# Patient Record
Sex: FEMALE | Race: WHITE | NOT HISPANIC OR LATINO | Employment: FULL TIME | URBAN - METROPOLITAN AREA
[De-identification: names, ages, dates, MRNs, and addresses within clinical notes are randomized per-mention and may not be internally consistent; named-entity substitution may affect disease eponyms.]

---

## 2017-02-16 ENCOUNTER — TELEPHONE (OUTPATIENT)
Dept: OBGYN | Facility: CLINIC | Age: 42
End: 2017-02-16

## 2017-02-16 ENCOUNTER — RADIANT APPOINTMENT (OUTPATIENT)
Dept: ULTRASOUND IMAGING | Facility: CLINIC | Age: 42
End: 2017-02-16
Payer: COMMERCIAL

## 2017-02-16 ENCOUNTER — OFFICE VISIT (OUTPATIENT)
Dept: OBGYN | Facility: CLINIC | Age: 42
End: 2017-02-16
Payer: COMMERCIAL

## 2017-02-16 VITALS — BODY MASS INDEX: 24.55 KG/M2 | WEIGHT: 143 LBS

## 2017-02-16 DIAGNOSIS — D25.1 INTRAMURAL LEIOMYOMA OF UTERUS: ICD-10-CM

## 2017-02-16 DIAGNOSIS — N84.0 ENDOMETRIAL POLYP: Primary | ICD-10-CM

## 2017-02-16 PROCEDURE — 99213 OFFICE O/P EST LOW 20 MIN: CPT | Performed by: OBSTETRICS & GYNECOLOGY

## 2017-02-16 PROCEDURE — 76830 TRANSVAGINAL US NON-OB: CPT | Performed by: OBSTETRICS & GYNECOLOGY

## 2017-02-16 NOTE — MR AVS SNAPSHOT
"              After Visit Summary   2/16/2017    Claire Khan    MRN: [id number]           Patient Information     Date Of Birth          1975        Visit Information        Provider Department      2/16/2017 3:45 PM Sj Samuel MD [facility]        Today's Diagnoses     Endometrial polyp    -  1       Follow-ups after your visit        Your next 10 appointments already scheduled     Mar 28, 2017  3:45 PM CDT   SHORT with Sj Samuel MD   [facility] ([facility])    [address] [place] [id number] [phone]              Who to contact     If you have questions or need follow up information about today's clinic visit or your schedule please contact [facility] directly at [phone].  Normal or non-critical lab and imaging results will be communicated to you by Humhart, letter or phone within 4 business days after the clinic has received the results. If you do not hear from us within 7 days, please contact the clinic through Humhart or phone. If you have a critical or abnormal lab result, we will notify you by phone as soon as possible.  Submit refill requests through DataFlyte or call your pharmacy and they will forward the refill request to us. Please allow 3 business days for your refill to be completed.          Additional Information About Your Visit        MyChart Information     DataFlyte lets you send messages to your doctor, view your test results, renew your prescriptions, schedule appointments and more. To sign up, go to [email or web address] . Click on \"Log in\" on the left side of the screen, which will take you to the Welcome page. Then click on \"Sign up Now\" on the right side of the page.     You will be asked to enter the access code listed below, as well as some personal information. Please follow the directions to create your username and password.     Your access code " is: [email or web address]  Expires: 2017  3:55 PM     Your access code will  in 90 days. If you need help or a new code, please call your [place] clinic or [phone].        Care EveryWhere ID     This is your Care EveryWhere ID. This could be used by other organizations to access your [place] medical records  [id number]        Your Vitals Were     Last Period BMI (Body Mass Index)                2017 (Exact Date) 24.55 kg/m2           Blood Pressure from Last 3 Encounters:   16 124/70   16 142/82   10/24/16 120/76    Weight from Last 3 Encounters:   17 143 lb (64.9 kg)   16 146 lb (66.2 kg)   16 148 lb (67.1 kg)              Today, you had the following     No orders found for display       Primary Care Provider Office Phone # Fax #    Rizwan Lazo [phone]       [facility] [address] [place] [address]        Thank you!     Thank you for choosing [facility]  for your care. Our goal is always to provide you with excellent care. Hearing back from our patients is one way we can continue to improve our services. Please take a few minutes to complete the written survey that you may receive in the mail after your visit with us. Thank you!             Your Updated Medication List - Protect others around you: Learn how to safely use, store and throw away your medicines at www.disposemymeds.org.          This list is accurate as of: 17  3:59 PM.  Always use your most recent med list.                   Brand Name Dispense Instructions for use    ALPRAZolam 1 MG tablet    XANAX     TK 1 T PO QD PRN       aspirin 325 MG tablet      Take 325 mg by mouth daily       baclofen 10 MG tablet    LIORESAL     TK ONE T PO BID PRF MUSCLE SPASM       garlic 150 MG Tabs tablet      Take 150 mg by mouth daily Reported on 2017       GLUCOSAMINE CHOND COMPLEX/MSM PO      Reported on 2017       oxybutynin chloride 15  MG Tb24      Take 15 mg by mouth daily       TYLENOL 500 MG tablet   Generic drug:  acetaminophen      Take 500-1,000 mg by mouth every 6 hours as needed.       ZOLOFT PO      Take 100 mg by mouth daily

## 2017-02-16 NOTE — TELEPHONE ENCOUNTER
Patient surgery scheduled on 4/6/2017 at 7:20AM Check in 5:30AM  Location for surgery to performed:   Surgery Outpatient  Scheduled by Lizet 2/16/2017     Information Packet given :Yes: HANDED 2/16/2017    CPT codes given: Yes    24517   90431     Consents signed? No  Rep Informed :N/A    PREOP DATE :  3/28/2017  In Epic :Yes    On Spreadsheet :Yes    On Calendar EB  :Yes    In [place] Calendar MIKE  :No    Assist NA   Assist in Epic NA  Assist Notified as needed :No     Jaky Allison  Surgery Scheduler      Amount of time needed for the procedure: 30 minutes   Expected time off from work: 2 days  Surgeon: Sj Samuel MD  Surgical Procedure:  Hysteroscopy, polypectomy, endometrial curettage, possible endometrial ablation  Preop Needed: No  Location for surgery to performed: Surgery Outpatient  Anesthesia: MAC and possible spinal            Allergies   Allergen Reactions     Bactrim         Blisters or cancer sores on tongue     Paxil [Paroxetine]         Panic attack     Sulfa Drugs Unknown       Mouth Sores     Vicodin [Hydrocodone-Acetaminophen]           DIAGNOSIS: Menorrhagia, endometrial polyp     Special Instructions:

## 2017-02-16 NOTE — LETTER
February 24, 2017      Claire Khan  [address] [facility] [address]      Dear Claire,    We have made effort to obtain an accurate quote from your insurance company based on the information we have on file. Your actual coverage may differ. [facility] can NOT guarantee coverage. We recommend that if you have questions regarding coverage to call your insurance company. Our billing department is happy to assist you with your insurance claim but you are responsible for all services rendered whether or not they are paid by your insurance provider. Again, this is not a guarantee of benefits just a notice of the information they have given to us.     Ins[employer] Phone # [phone]  ID [id number]   Group [id number]  Jaky torres/ [place]  On 2/24/2017     Policy Eff Date 1/1/2017 and current Yes  CoInsurance (covered at) 80% after deductible has been met.  Deductible $500.00 met to date $500.00  MOOP $3500.00 met to date $706.80  CoPay NA  Prior Auth Required:  No  Pre Existing Condition No  Surgeon JADYN Samuel In Network Yes  [facility] In Network Yes  Referral Needed:  No.  Mailed to Patient Yes                  If No Document why by date                  If Yes                                   Date 2/24/2017    Sincerely,        Jaky Allison  Surgery Scheduler

## 2017-02-16 NOTE — PROGRESS NOTES
SUBJECTIVE:                                                   Claire Khan is a 41 year old female who presents to clinic today for the following health issue(s):  Patient presents with:  Ultrasound: uterine fibroids        HPI: The patient is seen in follow-up of possible uterine fibroids and abnormal bleeding.  An ultrasound is planned for today.    Patient's last menstrual period was 01/28/2017 (exact date)..   Patient is sexually active, No obstetric history on file..  Using condoms for contraception.    reports that she has never smoked. She has never used smokeless tobacco.    STD testing offered?  Declined    Health maintenance updated:  yes    Today's PHQ-2 Score:   PHQ-2 ( 1999 [employer]) 8/18/2015   Q1: Little interest or pleasure in doing things 0   Q2: Feeling down, depressed or hopeless 0   PHQ-2 Score 0     Today's PHQ-9 Score: No flowsheet data found.  Today's PINA-7 Score: No flowsheet data found.    Problem list and histories reviewed & adjusted, as indicated.  Additional history: as documented.    Patient Active Problem List   Diagnosis   (none) - all problems resolved or deleted     Past Surgical History   Procedure Laterality Date     Partial hymenectomy/revision hymenal ring       External ear surgery       [place] teeth[       Arthroscopy knee with patellar realignment  6/29/2012     Procedure: ARTHROSCOPY KNEE WITH PATELLAR REALIGNMENT;  Right Knee Arthroscopy, Right Medial Patellar Femoral Ligament  Reconstruction with Graft, Partial Lateral Facetectomy, Lateral Retinacular Lengthening  ;  Surgeon: Rain Noe MD;  [facility]     Arthroscopy knee with retinacular release medial or lateral  5/30/2014     Procedure: ARTHROSCOPY KNEE WITH RETINACULAR RELEASE MEDIAL OR LATERAL;  Surgeon: Rain Noe MD;  [facility]      Social History   Substance Use Topics     Smoking status: Never Smoker     Smokeless tobacco: Never Used     Alcohol use Yes      Comment: 1 DRINK  PER MONTH      Problem (# of Occurrences) Relation (Name,Age of Onset)    C.A.D. (1) Mother    DIABETES (1) Father            Current Outpatient Prescriptions   Medication Sig     ALPRAZolam (XANAX) 1 MG tablet TK 1 T PO QD PRN     baclofen (LIORESAL) 10 MG tablet TK ONE T PO BID PRF MUSCLE SPASM     oxybutynin chloride 15 MG TB24 Take 15 mg by mouth daily     aspirin 325 MG tablet Take 325 mg by mouth daily     acetaminophen (TYLENOL) 500 MG tablet Take 500-1,000 mg by mouth every 6 hours as needed.     Sertraline HCl (ZOLOFT PO) Take 100 mg by mouth daily      garlic 150 MG TABS Take 150 mg by mouth daily Reported on 2/16/2017     Misc Natural Products (GLUCOSAMINE CHOND COMPLEX/MSM PO) Reported on 2/16/2017     No current facility-administered medications for this visit.      Allergies   Allergen Reactions     Bactrim      Blisters or cancer sores on tongue     Paxil [Paroxetine]      Panic attack     Sulfa Drugs Unknown     Mouth Sores     Vicodin [Hydrocodone-Acetaminophen]        ROS:  12 point review of systems negative other than symptoms noted below.  Genitourinary: Pelvic Pain  Psychiatric: Anxiety    OBJECTIVE:     Wt 143 lb (64.9 kg)  LMP 01/28/2017 (Exact Date)  BMI 24.55 kg/m2  Body mass index is 24.55 kg/(m^2).    Exam:  Constitutional:  Appearance: Well nourished, well developed alert, in no acute distress  Chest:  Respiratory Effort:  Breathing unlabored  Cardiovascular: Heart: Auscultation:  Regular rate, normal rhythm, no murmurs present  Gastrointestinal:  Abdominal Examination:  Abdomen nontender to palpation, tone normal without rigidity or guarding, no masses present, umbilicus without lesions; Liver/Spleen:  No hepatomegaly present, liver nontender to palpation; Hernias:  No hernias present  No Pelvic Exam performed     In-Clinic Test Results:  Results for orders placed or performed in visit on 02/16/17   US Transvaginal Non OB    Narrative    US Transvaginal Non OB    Order #: [id number]  Accession #: [id number]         Study Notes        Jennifer Louise on 2/16/2017  3:25 PM     Gynecological Ultrasound Report  Pelvic U/S - Transvaginal  [facility]  Referring Provider: DR CRUZ  Sonographer: JENNIFER LOUISE  Indication: recheck fibroid  LMP: No LMP recorded.  History: fibroid(s)  Gynecological Ultrasonography:   Uterus: anteverted  Size: 7.3 x 6.7 x 4.4cm  Findings: single left subserosal fibroid measures 3.5x3.1cm versus   3.0x2.8cm on ultrasound 10/24/2016.   Endometrium: Thickness total 8.6mm  Findings: 2D and 3D views show possible 6mm intra-cavity polyp versus clot  Right Ovary: 3.2 x 2.0cm   Left Ovary: 2.7 x 1.7cm  Cul de Sac/Pouch of Mahesh: clear      Impression: myoma     SONOGRAPHER NOTES TO READING PROVIDER: No pelvic cyst, mass or free fluid.                  Discussed here         ASSESSMENT/PLAN:                                                      The patient is seen at this time and evaluation of abnormal bleeding. Her ultrasound shows a subserosal 3 x 3.5 cm fundal fibroid. She also has an endometrial polyp. We recommend a hysteroscopy with polypectomy and possible endometrial ablation if she has decided that she is completely done with childbearing.    Amount of time needed for the procedure:  30 minutes   Expected time off from work:  2 days  Surgeon:  Sj Cruz MD  Surgical Procedure:  Hysteroscopy, polypectomy, endometrial curettage, possible endometrial ablation  Preop Needed:  No  Location for surgery to performed:   Surgery Outpatient  Anesthesia:  MAC and possible spinal     Allergies   Allergen Reactions     Bactrim      Blisters or cancer sores on tongue     Paxil [Paroxetine]      Panic attack     Sulfa Drugs Unknown     Mouth Sores     Vicodin [Hydrocodone-Acetaminophen]        DIAGNOSIS:  Menorrhagia, endometrial polyp    Special Instructions:              Sj Cruz MD  [facility]

## 2017-02-24 NOTE — TELEPHONE ENCOUNTER
We have made effort to obtain an accurate quote from your insurance company based on the information we have on file. Your actual coverage may differ. [facility] can NOT guarantee coverage. We recommend that if you have questions regarding coverage to call your insurance company. Our billing department is happy to assist you with your insurance claim but you are responsible for all services rendered whether or not they are paid by your insurance provider. Again, this is not a guarantee of benefits just a notice of the information they have given to us.     [employer] Phone # [phone]  ID [id number]   Group [id number]  Jaky georges w/ [place]  On 2/24/2017     Policy Eff Date 1/1/2017 and current Yes  CoInsurance (covered at) 80% after deductible has been met.  Deductible $500.00 met to date $500.00  MOOP $3500.00 met to date $706.80  CoPay NA  Prior Auth Required:  No  Pre Existing Condition No  Surgeon JADYN Samuel In Network Yes  [facility]ospital [facility] In Network Yes  Referral Needed:  No.  Mailed to Patient Yes                  If No Document why by date                  If Yes                                   Date 2/24/2017    Jaky Allison  Surgery Scheduler

## 2017-03-07 ENCOUNTER — TELEPHONE (OUTPATIENT)
Dept: OBGYN | Facility: CLINIC | Age: 42
End: 2017-03-07

## 2017-03-07 NOTE — TELEPHONE ENCOUNTER
Patient has her Pre-op scheduled the end of March and she's wondering why she isn't getting another ultra sound prior to her surgery. She would like someone to call her back.

## 2017-03-07 NOTE — TELEPHONE ENCOUNTER
Reason for Call:  Other call back    Detailed comments: pt called back and wants triage to call her. She is wondering if she needs an ultrasound at her pre-op appt?    Phone Number Patient can be reached at: Cell number on file:    Telephone Information:   Mobile [phone]       Best Time: today    Can we leave a detailed message on this number? YES    Call taken on 3/7/2017 at 4:11 PM by Darcie Chowdhury

## 2017-03-07 NOTE — TELEPHONE ENCOUNTER
Pt has 4/6/17 HYSTEROSCOPY, POLYPECTOMY WITH VERSAPOINT, POSSIBLE NOVASURE ABLATION scheduled. Pt would like to know why she is not having an US prior to surgery. Reviewed with Sabrina Castillo and there is not a reason to repeat the US prior to surgery. LMTCB

## 2017-03-08 NOTE — TELEPHONE ENCOUNTER
LM informing pt that according to Dr. Samuel and Sabrina Castillo no need for u/s at pt's pre-op appt. Left call back number for any questions.

## 2017-03-08 NOTE — TELEPHONE ENCOUNTER
Pt has pre-op scheduled 3/28/17 and wants to know if she should be getting and ultrasound at her pre-op appt and if not the reasoning why. Note routed to Sabrina Castillo to review and advise.

## 2017-03-28 ENCOUNTER — OFFICE VISIT (OUTPATIENT)
Dept: OBGYN | Facility: CLINIC | Age: 42
End: 2017-03-28
Payer: COMMERCIAL

## 2017-03-28 VITALS
WEIGHT: 146 LBS | BODY MASS INDEX: 24.92 KG/M2 | DIASTOLIC BLOOD PRESSURE: 82 MMHG | HEIGHT: 64 IN | SYSTOLIC BLOOD PRESSURE: 136 MMHG

## 2017-03-28 DIAGNOSIS — N84.0 ENDOMETRIAL POLYP: Primary | ICD-10-CM

## 2017-03-28 DIAGNOSIS — Z01.818 PRE-OP EXAM: Primary | ICD-10-CM

## 2017-03-28 LAB — HGB BLD-MCNC: 14.6 G/DL (ref 11.7–15.7)

## 2017-03-28 PROCEDURE — 99214 OFFICE O/P EST MOD 30 MIN: CPT | Performed by: OBSTETRICS & GYNECOLOGY

## 2017-03-28 PROCEDURE — 36415 COLL VENOUS BLD VENIPUNCTURE: CPT | Performed by: OBSTETRICS & GYNECOLOGY

## 2017-03-28 PROCEDURE — 85018 HEMOGLOBIN: CPT | Performed by: OBSTETRICS & GYNECOLOGY

## 2017-03-28 NOTE — PROGRESS NOTES
SUBJECTIVE:                                                   Claire Khan is a 42 year old female who presents to clinic today for the following health issue(s):  Patient presents with:  Pre-Op Exam: scheduled for hysteroscopy, polypectomy and possible ablation on 4/6/17      HPI: The patient is seen for clearance for an outpatient hysteroscopic procedure. She has an intrauterine filling defect that is either a polyp or a fibroid. She also has a subserosal 3.5 cm fibroid that is not symptomatic. We have discussed hysteroscopy with removal of the mass for pathology. We have discussed doing an endometrial ablation so that she cannot reform any polyps. The patient seems to be somewhat ambivalent today about childbearing and I told her that she clearly had to make that decision and conveyed that information be at her preop consultation just before surgery. We will review her consent form at that time.      Patient's last menstrual period was 03/19/2017..   Patient is sexually active  Using codoms for contraception.   reports that she has never smoked. She has never used smokeless tobacco.      Problem list and histories reviewed & adjusted, as indicated.  Additional history: as documented.    Patient Active Problem List   Diagnosis   (none) - all problems resolved or deleted     Past Surgical History:   Procedure Laterality Date     ARTHROSCOPY KNEE WITH PATELLAR REALIGNMENT  6/29/2012    Procedure: ARTHROSCOPY KNEE WITH PATELLAR REALIGNMENT;  Right Knee Arthroscopy, Right Medial Patellar Femoral Ligament  Reconstruction with Graft, Partial Lateral Facetectomy, Lateral Retinacular Lengthening  ;  Surgeon: Rain Noe MD;  [facility]     ARTHROSCOPY KNEE WITH RETINACULAR RELEASE  5/30/2014    Procedure: ARTHROSCOPY KNEE WITH RETINACULAR RELEASE MEDIAL OR LATERAL;  Surgeon: Rain Noe MD;  [facility]     EXTERNAL EAR SURGERY       PARTIAL HYMENECTOMY/REVISION HYMENAL RING        "WISDOM TEETH[        Social History   Substance Use Topics     Smoking status: Never Smoker     Smokeless tobacco: Never Used     Alcohol use Yes      Comment: 1 DRINK PER MONTH      Problem (# of Occurrences) Relation (Name,Age of Onset)    C.A.D. (1) Mother    DIABETES (1) Father            Current Outpatient Prescriptions   Medication Sig     Multiple Vitamins-Minerals (MULTIVITAMIN ADULT PO)      Cyanocobalamin (VITAMIN B12 PO)      Cholecalciferol (VITAMIN D-3 PO)      Omega-3 Fatty Acids (FISH OIL PO)      TRAMADOL HCL PO Take 50 mg by mouth     ALPRAZolam (XANAX) 1 MG tablet TK 1 T PO QD PRN     baclofen (LIORESAL) 10 MG tablet TK ONE T PO BID PRF MUSCLE SPASM     aspirin 325 MG tablet Take 325 mg by mouth daily     acetaminophen (TYLENOL) 500 MG tablet Take 500-1,000 mg by mouth every 6 hours as needed.     Sertraline HCl (ZOLOFT PO) Take 100 mg by mouth daily      oxybutynin chloride 15 MG TB24 Take 15 mg by mouth as needed Reported on 3/28/2017     No current facility-administered medications for this visit.      Allergies   Allergen Reactions     Bactrim      Blisters or cancer sores on tongue     Paxil [Paroxetine]      Panic attack     Sulfa Drugs Unknown     Mouth Sores     Vicodin [Hydrocodone-Acetaminophen]      anxiety       ROS:  12 point review of systems negative other than symptoms noted below.  Constitutional: Fatigue  Genitourinary: Pelvic Pain  Psychiatric: Anxiety and Depression    OBJECTIVE:     /82  Ht 5' 4\" (1.626 m)  Wt 146 lb (66.2 kg)  LMP 03/19/2017  BMI 25.06 kg/m2  Body mass index is 25.06 kg/(m^2).    Exam:  Constitutional:  Appearance: Well nourished, well developed alert, in no acute distress  Neck:  Lymph Nodes:  No lymphadenopathy present; Thyroid:  Gland size normal, nontender, no nodules or masses present on palpation  Chest:  Respiratory Effort:  Breathing unlabored  Cardiovascular: Heart: Auscultation:  Regular rate, normal rhythm, no murmurs " present  Gastrointestinal:  Abdominal Examination:  Abdomen nontender to palpation, tone normal without rigidity or guarding, no masses present, umbilicus without lesions; Liver/Spleen:  No hepatomegaly present, liver nontender to palpation; Hernias:  No hernias present  Lymphatic: Lymph Nodes:  No other lymphadenopathy present  Skin:General Inspection:  No rashes present, no lesions present, no areas of discoloration; Genitalia and Groin:  No rashes present, no lesions present, no areas of discoloration, no masses present.  No Pelvic Exam performed today    In-Clinic Test Results:  Results for orders placed or performed in visit on 03/28/17 (from the past 24 hour(s))   Hemoglobin   Result Value Ref Range    Hemoglobin 14.6 11.7 - 15.7 g/dL       ASSESSMENT/PLAN:                                                      42-year-old patient who is scheduled for a hysteroscopic polypectomy and possible ablation. The patient wants this done under spinal anesthetic. She also has factor V. She will not take any aspirin for a week prior to surgery. The risks and Complications of the procedure have been reviewed and accepted. The patient will decide on the ablation portion depending on her desire for any fertility.            Sj Samuel MD  [facility] [place]

## 2017-03-28 NOTE — MR AVS SNAPSHOT
"              After Visit Summary   3/28/2017    Claire Khan    MRN: [id number]           Patient Information     Date Of Birth          1975        Visit Information        Provider Department      3/28/2017 3:45 PM Sj Samuel MD [facility]         Follow-ups after your visit        Your next 10 appointments already scheduled     2017   Procedure with Sj Samuel MD   [facility] (--)    [address], [address]  [facility] [id number] [phone]              Who to contact     If you have questions or need follow up information about today's clinic visit or your schedule please contact [facility] directly at [phone].  Normal or non-critical lab and imaging results will be communicated to you by MyChart, letter or phone within 4 business days after the clinic has received the results. If you do not hear from us within 7 days, please contact the clinic through MyChart or phone. If you have a critical or abnormal lab result, we will notify you by phone as soon as possible.  Submit refill requests through [employer] or call your pharmacy and they will forward the refill request to us. Please allow 3 business days for your refill to be completed.          Additional Information About Your Visit        MyChart Information     [employer] lets you send messages to your doctor, view your test results, renew your prescriptions, schedule appointments and more. To sign up, go to [email or web address] . Click on \"Log in\" on the left side of the screen, which will take you to the Welcome page. Then click on \"Sign up Now\" on the right side of the page.     You will be asked to enter the access code listed below, as well as some personal information. Please follow the directions to create your username and password.     Your access code is: [email or web address]  Expires: 2017  4:55 PM     Your access code will  in 90 days. If " "you need help or a new code, please call your [facility] clinic or [phone].        Care EveryWhere ID     This is your Care EveryWhere ID. This could be used by other organizations to access your [facility] medical records  [id number]        Your Vitals Were     Height Last Period BMI (Body Mass Index)             5' 4\" (1.626 m) 03/19/2017 25.06 kg/m2          Blood Pressure from Last 3 Encounters:   03/28/17 136/82   12/07/16 124/70   11/02/16 142/82    Weight from Last 3 Encounters:   03/28/17 146 lb (66.2 kg)   02/16/17 143 lb (64.9 kg)   12/07/16 146 lb (66.2 kg)              Today, you had the following     No orders found for display       Primary Care Provider Office Phone # Fax #    Rizwan Lazo [phone]       [facility] [address] [place] [address]        Thank you!     Thank you for choosing [facility]  for your care. Our goal is always to provide you with excellent care. Hearing back from our patients is one way we can continue to improve our services. Please take a few minutes to complete the written survey that you may receive in the mail after your visit with us. Thank you!             Your Updated Medication List - Protect others around you: Learn how to safely use, store and throw away your medicines at www.disposemymeds.org.          This list is accurate as of: 3/28/17  4:24 PM.  Always use your most recent med list.                   Brand Name Dispense Instructions for use    ALPRAZolam 1 MG tablet    XANAX     TK 1 T PO QD PRN       aspirin 325 MG tablet      Take 325 mg by mouth daily       baclofen 10 MG tablet    LIORESAL     TK ONE T PO BID PRF MUSCLE SPASM       FISH OIL PO          MULTIVITAMIN ADULT PO          oxybutynin chloride 15 MG Tb24      Take 15 mg by mouth as needed Reported on 3/28/2017       TRAMADOL HCL PO      Take 50 mg by mouth       TYLENOL 500 MG tablet   Generic drug:  acetaminophen      Take " 500-1,000 mg by mouth every 6 hours as needed.       VITAMIN B12 PO          VITAMIN D-3 PO          ZOLOFT PO      Take 100 mg by mouth daily

## 2017-04-04 NOTE — H&P (VIEW-ONLY)
SUBJECTIVE:                                                   Claire Khan is a 42 year old female who presents to clinic today for the following health issue(s):  Patient presents with:  Pre-Op Exam: scheduled for hysteroscopy, polypectomy and possible ablation on 4/6/17      HPI: The patient is seen for clearance for an outpatient hysteroscopic procedure. She has an intrauterine filling defect that is either a polyp or a fibroid. She also has a subserosal 3.5 cm fibroid that is not symptomatic. We have discussed hysteroscopy with removal of the mass for pathology. We have discussed doing an endometrial ablation so that she cannot reform any polyps. The patient seems to be somewhat ambivalent today about childbearing and I told her that she clearly had to make that decision and conveyed that information be at her preop consultation just before surgery. We will review her consent form at that time.      Patient's last menstrual period was 03/19/2017..   Patient is sexually active  Using codoms for contraception.   reports that she has never smoked. She has never used smokeless tobacco.      Problem list and histories reviewed & adjusted, as indicated.  Additional history: as documented.    Patient Active Problem List   Diagnosis   (none) - all problems resolved or deleted     Past Surgical History:   Procedure Laterality Date     ARTHROSCOPY KNEE WITH PATELLAR REALIGNMENT  6/29/2012    Procedure: ARTHROSCOPY KNEE WITH PATELLAR REALIGNMENT;  Right Knee Arthroscopy, Right Medial Patellar Femoral Ligament  Reconstruction with Graft, Partial Lateral Facetectomy, Lateral Retinacular Lengthening  ;  Surgeon: Rain Noe MD;  [facility]     ARTHROSCOPY KNEE WITH RETINACULAR RELEASE  5/30/2014    Procedure: ARTHROSCOPY KNEE WITH RETINACULAR RELEASE MEDIAL OR LATERAL;  Surgeon: Rain Noe MD;  Location: [facility]     EXTERNAL EAR SURGERY       PARTIAL HYMENECTOMY/REVISION HYMENAL RING        "WISDOM TEETH[        Social History   Substance Use Topics     Smoking status: Never Smoker     Smokeless tobacco: Never Used     Alcohol use Yes      Comment: 1 DRINK PER MONTH      Problem (# of Occurrences) Relation (Name,Age of Onset)    C.A.D. (1) Mother    DIABETES (1) Father            Current Outpatient Prescriptions   Medication Sig     Multiple Vitamins-Minerals (MULTIVITAMIN ADULT PO)      Cyanocobalamin (VITAMIN B12 PO)      Cholecalciferol (VITAMIN D-3 PO)      Omega-3 Fatty Acids (FISH OIL PO)      TRAMADOL HCL PO Take 50 mg by mouth     ALPRAZolam (XANAX) 1 MG tablet TK 1 T PO QD PRN     baclofen (LIORESAL) 10 MG tablet TK ONE T PO BID PRF MUSCLE SPASM     aspirin 325 MG tablet Take 325 mg by mouth daily     acetaminophen (TYLENOL) 500 MG tablet Take 500-1,000 mg by mouth every 6 hours as needed.     Sertraline HCl (ZOLOFT PO) Take 100 mg by mouth daily      oxybutynin chloride 15 MG TB24 Take 15 mg by mouth as needed Reported on 3/28/2017     No current facility-administered medications for this visit.      Allergies   Allergen Reactions     Bactrim      Blisters or cancer sores on tongue     Paxil [Paroxetine]      Panic attack     Sulfa Drugs Unknown     Mouth Sores     Vicodin [Hydrocodone-Acetaminophen]      anxiety       ROS:  12 point review of systems negative other than symptoms noted below.  Constitutional: Fatigue  Genitourinary: Pelvic Pain  Psychiatric: Anxiety and Depression    OBJECTIVE:     /82  Ht 5' 4\" (1.626 m)  Wt 146 lb (66.2 kg)  LMP 03/19/2017  BMI 25.06 kg/m2  Body mass index is 25.06 kg/(m^2).    Exam:  Constitutional:  Appearance: Well nourished, well developed alert, in no acute distress  Neck:  Lymph Nodes:  No lymphadenopathy present; Thyroid:  Gland size normal, nontender, no nodules or masses present on palpation  Chest:  Respiratory Effort:  Breathing unlabored  Cardiovascular: Heart: Auscultation:  Regular rate, normal rhythm, no murmurs " present  Gastrointestinal:  Abdominal Examination:  Abdomen nontender to palpation, tone normal without rigidity or guarding, no masses present, umbilicus without lesions; Liver/Spleen:  No hepatomegaly present, liver nontender to palpation; Hernias:  No hernias present  Lymphatic: Lymph Nodes:  No other lymphadenopathy present  Skin:General Inspection:  No rashes present, no lesions present, no areas of discoloration; Genitalia and Groin:  No rashes present, no lesions present, no areas of discoloration, no masses present.  No Pelvic Exam performed today    In-Clinic Test Results:  Results for orders placed or performed in visit on 03/28/17 (from the past 24 hour(s))   Hemoglobin   Result Value Ref Range    Hemoglobin 14.6 11.7 - 15.7 g/dL       ASSESSMENT/PLAN:                                                      42-year-old patient who is scheduled for a hysteroscopic polypectomy and possible ablation. The patient wants this done under spinal anesthetic. She also has factor V. She will not take any aspirin for a week prior to surgery. The risks and Complications of the procedure have been reviewed and accepted. The patient will decide on the ablation portion depending on her desire for any fertility.            Sj Samuel MD  [facility]

## 2017-04-05 ENCOUNTER — TELEPHONE (OUTPATIENT)
Dept: NURSING | Facility: CLINIC | Age: 42
End: 2017-04-05

## 2017-04-05 NOTE — TELEPHONE ENCOUNTER
Pt has surgery scheduled tomorrow HYSTEROSCOPY, POLYPECTOMY WITH VERSAPOINT, POSSIBLE NOVASURE ABLATION. Pt wanted to know if she could take a Xanax tonight. Reviewed with Dr. Samuel. OK to take Xanax tonight. Left detailed message on pt's voicemail. Included call back number.

## 2017-04-06 ENCOUNTER — HOSPITAL ENCOUNTER (OUTPATIENT)
Facility: CLINIC | Age: 42
Discharge: HOME | End: 2017-04-06
Attending: OBSTETRICS & GYNECOLOGY | Admitting: OBSTETRICS & GYNECOLOGY
Payer: COMMERCIAL

## 2017-04-06 ENCOUNTER — ANESTHESIA (OUTPATIENT)
Dept: SURGERY | Facility: CLINIC | Age: 42
End: 2017-04-06
Payer: COMMERCIAL

## 2017-04-06 ENCOUNTER — ANESTHESIA EVENT (OUTPATIENT)
Dept: SURGERY | Facility: CLINIC | Age: 42
End: 2017-04-06
Payer: COMMERCIAL

## 2017-04-06 VITALS
OXYGEN SATURATION: 99 % | TEMPERATURE: 98 F | WEIGHT: 143.9 LBS | RESPIRATION RATE: 23 BRPM | BODY MASS INDEX: 24.57 KG/M2 | HEIGHT: 64 IN | SYSTOLIC BLOOD PRESSURE: 126 MMHG | DIASTOLIC BLOOD PRESSURE: 87 MMHG

## 2017-04-06 DIAGNOSIS — N84.0 ENDOMETRIUM, POLYP: Primary | ICD-10-CM

## 2017-04-06 LAB — HCG SERPL QL: NEGATIVE

## 2017-04-06 PROCEDURE — 25800025 ZZH RX 258: Performed by: NURSE ANESTHETIST, CERTIFIED REGISTERED

## 2017-04-06 PROCEDURE — 27210794 ZZH OR GENERAL SUPPLY STERILE: Performed by: OBSTETRICS & GYNECOLOGY

## 2017-04-06 PROCEDURE — 71000027 ZZH RECOVERY PHASE 2 EACH 15 MINS: Performed by: OBSTETRICS & GYNECOLOGY

## 2017-04-06 PROCEDURE — 36415 COLL VENOUS BLD VENIPUNCTURE: CPT | Performed by: ANESTHESIOLOGY

## 2017-04-06 PROCEDURE — 25000132 ZZH RX MED GY IP 250 OP 250 PS 637: Performed by: OBSTETRICS & GYNECOLOGY

## 2017-04-06 PROCEDURE — 37000009 ZZH ANESTHESIA TECHNICAL FEE, EACH ADDTL 15 MIN: Performed by: OBSTETRICS & GYNECOLOGY

## 2017-04-06 PROCEDURE — 25000128 H RX IP 250 OP 636: Performed by: NURSE ANESTHETIST, CERTIFIED REGISTERED

## 2017-04-06 PROCEDURE — 25000125 ZZHC RX 250: Performed by: NURSE ANESTHETIST, CERTIFIED REGISTERED

## 2017-04-06 PROCEDURE — 84703 CHORIONIC GONADOTROPIN ASSAY: CPT | Performed by: ANESTHESIOLOGY

## 2017-04-06 PROCEDURE — 88305 TISSUE EXAM BY PATHOLOGIST: CPT | Mod: 26 | Performed by: OBSTETRICS & GYNECOLOGY

## 2017-04-06 PROCEDURE — 71000012 ZZH RECOVERY PHASE 1 LEVEL 1 FIRST HR: Performed by: OBSTETRICS & GYNECOLOGY

## 2017-04-06 PROCEDURE — 27210995 ZZH RX 272: Performed by: OBSTETRICS & GYNECOLOGY

## 2017-04-06 PROCEDURE — 88305 TISSUE EXAM BY PATHOLOGIST: CPT | Performed by: OBSTETRICS & GYNECOLOGY

## 2017-04-06 PROCEDURE — 25800025 ZZH RX 258: Performed by: OBSTETRICS & GYNECOLOGY

## 2017-04-06 PROCEDURE — 58558 HYSTEROSCOPY BIOPSY: CPT | Performed by: OBSTETRICS & GYNECOLOGY

## 2017-04-06 PROCEDURE — 37000008 ZZH ANESTHESIA TECHNICAL FEE, 1ST 30 MIN: Performed by: OBSTETRICS & GYNECOLOGY

## 2017-04-06 PROCEDURE — 40000170 ZZH STATISTIC PRE-PROCEDURE ASSESSMENT II: Performed by: OBSTETRICS & GYNECOLOGY

## 2017-04-06 PROCEDURE — 36000056 ZZH SURGERY LEVEL 3 1ST 30 MIN: Performed by: OBSTETRICS & GYNECOLOGY

## 2017-04-06 RX ORDER — KETOROLAC TROMETHAMINE 30 MG/ML
30 INJECTION, SOLUTION INTRAMUSCULAR; INTRAVENOUS EVERY 6 HOURS PRN
Status: DISCONTINUED | OUTPATIENT
Start: 2017-04-06 | End: 2017-04-06 | Stop reason: HOSPADM

## 2017-04-06 RX ORDER — KETOROLAC TROMETHAMINE 30 MG/ML
INJECTION, SOLUTION INTRAMUSCULAR; INTRAVENOUS PRN
Status: DISCONTINUED | OUTPATIENT
Start: 2017-04-06 | End: 2017-04-06

## 2017-04-06 RX ORDER — OXYCODONE AND ACETAMINOPHEN 5; 325 MG/1; MG/1
1-2 TABLET ORAL
Status: COMPLETED | OUTPATIENT
Start: 2017-04-06 | End: 2017-04-06

## 2017-04-06 RX ORDER — LIDOCAINE HYDROCHLORIDE 20 MG/ML
INJECTION, SOLUTION INFILTRATION; PERINEURAL PRN
Status: DISCONTINUED | OUTPATIENT
Start: 2017-04-06 | End: 2017-04-06

## 2017-04-06 RX ORDER — PROPOFOL 10 MG/ML
INJECTION, EMULSION INTRAVENOUS PRN
Status: DISCONTINUED | OUTPATIENT
Start: 2017-04-06 | End: 2017-04-06

## 2017-04-06 RX ORDER — ONDANSETRON 4 MG/1
4 TABLET, ORALLY DISINTEGRATING ORAL EVERY 30 MIN PRN
Status: DISCONTINUED | OUTPATIENT
Start: 2017-04-06 | End: 2017-04-06 | Stop reason: HOSPADM

## 2017-04-06 RX ORDER — FENTANYL CITRATE 50 UG/ML
INJECTION, SOLUTION INTRAMUSCULAR; INTRAVENOUS PRN
Status: DISCONTINUED | OUTPATIENT
Start: 2017-04-06 | End: 2017-04-06

## 2017-04-06 RX ORDER — SODIUM CHLORIDE, SODIUM LACTATE, POTASSIUM CHLORIDE, CALCIUM CHLORIDE 600; 310; 30; 20 MG/100ML; MG/100ML; MG/100ML; MG/100ML
INJECTION, SOLUTION INTRAVENOUS CONTINUOUS PRN
Status: DISCONTINUED | OUTPATIENT
Start: 2017-04-06 | End: 2017-04-06

## 2017-04-06 RX ORDER — PROPOFOL 10 MG/ML
INJECTION, EMULSION INTRAVENOUS CONTINUOUS PRN
Status: DISCONTINUED | OUTPATIENT
Start: 2017-04-06 | End: 2017-04-06

## 2017-04-06 RX ORDER — MAGNESIUM HYDROXIDE 1200 MG/15ML
LIQUID ORAL PRN
Status: DISCONTINUED | OUTPATIENT
Start: 2017-04-06 | End: 2017-04-06 | Stop reason: HOSPADM

## 2017-04-06 RX ORDER — NALOXONE HYDROCHLORIDE 0.4 MG/ML
.1-.4 INJECTION, SOLUTION INTRAMUSCULAR; INTRAVENOUS; SUBCUTANEOUS
Status: DISCONTINUED | OUTPATIENT
Start: 2017-04-06 | End: 2017-04-06 | Stop reason: HOSPADM

## 2017-04-06 RX ORDER — MEPERIDINE HYDROCHLORIDE 25 MG/ML
12.5 INJECTION INTRAMUSCULAR; INTRAVENOUS; SUBCUTANEOUS
Status: DISCONTINUED | OUTPATIENT
Start: 2017-04-06 | End: 2017-04-06 | Stop reason: HOSPADM

## 2017-04-06 RX ORDER — OXYCODONE AND ACETAMINOPHEN 5; 325 MG/1; MG/1
1-2 TABLET ORAL EVERY 4 HOURS PRN
Qty: 10 TABLET | Refills: 0 | Status: SHIPPED | OUTPATIENT
Start: 2017-04-06 | End: 2017-11-13

## 2017-04-06 RX ORDER — SODIUM CHLORIDE, SODIUM LACTATE, POTASSIUM CHLORIDE, CALCIUM CHLORIDE 600; 310; 30; 20 MG/100ML; MG/100ML; MG/100ML; MG/100ML
INJECTION, SOLUTION INTRAVENOUS CONTINUOUS
Status: DISCONTINUED | OUTPATIENT
Start: 2017-04-06 | End: 2017-04-06 | Stop reason: HOSPADM

## 2017-04-06 RX ORDER — DEXAMETHASONE SODIUM PHOSPHATE 4 MG/ML
INJECTION, SOLUTION INTRA-ARTICULAR; INTRALESIONAL; INTRAMUSCULAR; INTRAVENOUS; SOFT TISSUE PRN
Status: DISCONTINUED | OUTPATIENT
Start: 2017-04-06 | End: 2017-04-06

## 2017-04-06 RX ORDER — ONDANSETRON 2 MG/ML
INJECTION INTRAMUSCULAR; INTRAVENOUS PRN
Status: DISCONTINUED | OUTPATIENT
Start: 2017-04-06 | End: 2017-04-06

## 2017-04-06 RX ORDER — PHENAZOPYRIDINE HYDROCHLORIDE 200 MG/1
200 TABLET, FILM COATED ORAL ONCE
Status: DISCONTINUED | OUTPATIENT
Start: 2017-04-06 | End: 2017-04-06 | Stop reason: HOSPADM

## 2017-04-06 RX ORDER — ONDANSETRON 2 MG/ML
4 INJECTION INTRAMUSCULAR; INTRAVENOUS EVERY 30 MIN PRN
Status: DISCONTINUED | OUTPATIENT
Start: 2017-04-06 | End: 2017-04-06 | Stop reason: HOSPADM

## 2017-04-06 RX ADMIN — FENTANYL CITRATE 50 MCG: 50 INJECTION, SOLUTION INTRAMUSCULAR; INTRAVENOUS at 07:14

## 2017-04-06 RX ADMIN — DEXAMETHASONE SODIUM PHOSPHATE 4 MG: 4 INJECTION, SOLUTION INTRAMUSCULAR; INTRAVENOUS at 07:26

## 2017-04-06 RX ADMIN — PROPOFOL 200 MG: 10 INJECTION, EMULSION INTRAVENOUS at 07:15

## 2017-04-06 RX ADMIN — SODIUM CHLORIDE, POTASSIUM CHLORIDE, SODIUM LACTATE AND CALCIUM CHLORIDE: 600; 310; 30; 20 INJECTION, SOLUTION INTRAVENOUS at 07:13

## 2017-04-06 RX ADMIN — PROPOFOL 100 MCG/KG/MIN: 10 INJECTION, EMULSION INTRAVENOUS at 07:15

## 2017-04-06 RX ADMIN — KETOROLAC TROMETHAMINE 30 MG: 30 INJECTION, SOLUTION INTRAMUSCULAR at 07:33

## 2017-04-06 RX ADMIN — ONDANSETRON 4 MG: 2 INJECTION INTRAMUSCULAR; INTRAVENOUS at 07:29

## 2017-04-06 RX ADMIN — MIDAZOLAM HYDROCHLORIDE 2 MG: 1 INJECTION, SOLUTION INTRAMUSCULAR; INTRAVENOUS at 07:14

## 2017-04-06 RX ADMIN — LIDOCAINE HYDROCHLORIDE 60 MG: 20 INJECTION, SOLUTION INFILTRATION; PERINEURAL at 07:15

## 2017-04-06 RX ADMIN — PROPOFOL 40 MG: 10 INJECTION, EMULSION INTRAVENOUS at 07:27

## 2017-04-06 RX ADMIN — OXYCODONE HYDROCHLORIDE AND ACETAMINOPHEN 1 TABLET: 5; 325 TABLET ORAL at 08:21

## 2017-04-06 NOTE — DISCHARGE INSTRUCTIONS
While you were at the hospital today you received Toradol, an antiinflammatory medication similar to Ibuprofen.  You should not take other antiinflammatory medication, such as Ibuprofen, Motrin, Advil, Aleve, Naprosyn, etc, until 1:30 pm.         Same Day Surgery Discharge Instructions for  Sedation and General Anesthesia       It's not unusual to feel dizzy, light-headed or faint for up to 24 hours after surgery or while taking pain medication.  If you have these symptoms: sit for a few minutes before standing and have someone assist you when you get up to walk or use the bathroom.      You should rest and relax for the next 24 hours. We recommend you make arrangements to have an adult stay with you for at least 24 hours after your discharge.  Avoid hazardous and strenuous activity.      DO NOT DRIVE any vehicle or operate mechanical equipment for 24 hours following the end of your surgery.  Even though you may feel normal, your reactions may be affected by the medication you have received.      Do not drink alcoholic beverages for 24 hours following surgery.       Slowly progress to your regular diet as you feel able. It's not unusual to feel nauseated and/or vomit after receiving anesthesia.  If you develop these symptoms, drink clear liquids (apple juice, ginger ale, broth, 7-up, etc. ) until you feel better.  If your nausea and vomiting persists for 24 hours, please notify your surgeon.        All narcotic pain medications, along with inactivity and anesthesia, can cause constipation. Drinking plenty of liquids and increasing fiber intake will help.      For any questions of a medical nature, call your surgeon.      Do not make important decisions for 24 hours.      If you had general anesthesia, you may have a sore throat for a couple of days related to the breathing tube used during surgery.  You may use Cepacol lozenges to help with this discomfort.  If it worsens or if you develop a fever, contact your  surgeon.       If you feel your pain is not well managed with the pain medications prescribed by your surgeon, please contact your surgeon's office to let them know so they can address your concerns.     [facility]  Discharge Instructions  Following D & C / Hysteroscopy    Activity  You may resume normal activities including lifting as needed.  It is permissible to climb stairs. You may drive after 24 hours as long as you are not taking narcotic pain pills.  Baths or showers are perfectly acceptable.      Vaginal Discharge  You may have some vaginal bleeding or discharge for about a week after procedure.  You may use tampons or pads.    Temperature  If you develop temperature elevations to over 101  Fahrenheit, your physician should be called immediately.    Diet  [place] or light diet is advisable the day of surgery.  If nausea persists, continue this diet.  If severe, call.    Follow-up  Make an appointment in 1-2 weeks if instructed to at: [phone]        [facility]  [address] [place] [address]  [phone]  LEE ANN Gregory MD   .        **If you have questions or concerns about your procedure,   call Dr. Samuel at [phone]**

## 2017-04-06 NOTE — ANESTHESIA CARE TRANSFER NOTE
Patient: Claire Khan    Procedure(s):  HYSTEROSCOPY, POLYPECTOMY WITH VERSAPOINT - Wound Class: II-Clean Contaminated    Diagnosis: ENDOMETRIAL POLYP  Diagnosis Additional Information: No value filed.    Anesthesia Type:   General     Note:  Airway :Face Mask and LMA  Patient transferred to:PACU  Comments: 739:  To par, spontaneous respirations.      Vitals: (Last set prior to Anesthesia Care Transfer)    CRNA VITALS  4/6/2017 0709 - 4/6/2017 0739      4/6/2017             Pulse: 50    SpO2: 98 %    Resp Rate (observed): 11    Resp Rate (set): 10                Electronically Signed By: JACQUELINE Mcarthur CRNA  April 6, 2017  7:39 AM

## 2017-04-06 NOTE — BRIEF OP NOTE
[facility] Brief Operative Note    Pre-operative diagnosis: ENDOMETRIAL POLYPS   Post-operative diagnosis SAME   Procedure: Procedure(s):  HYSTEROSCOPY, POLYPECTOMY, ENDOMETRIAL CURETTAGE - Wound Class: II-Clean Contaminated   Surgeon(s): Surgeon(s) and Role:      Sj Samuel MD - Primary   Estimated blood loss: 2 mL    Specimens:   ID Type Source Tests Collected by Time Destination   A : endometrial curettings and polyp Tissue Endometrium SURGICAL PATHOLOGY EXAM Sj Samuel MD 4/6/2017  7:26 AM       Findings: SEE OP NOTE

## 2017-04-06 NOTE — OP NOTE
DATE OF PROCEDURE:  2017      REASON FOR ADMISSION:  Intrauterine polyps.      OPERATIVE PROCEDURES:  Hysteroscopy, polypectomy, endometrial curettage.      OPERATIVE FINDINGS:  Claire Khan had multiple polyps, but 2 that were dominant.  These were cleaned out completely by curettage and selective polypectomy and all tissue submitted to the pathologist.      DESCRIPTION OF PROCEDURE:  After general anesthesia was induced, the patient was placed in the dorsal lithotomy position and prepped and draped in the usual fashion.  The cervix was grasped and carefully dilated.  The hysteroscope was placed, with multiple polyps identified.  Selective polypectomy followed by curettage effected a completely clean out cavity.  There were no complications.  All tissue was submitted to the pathologist.  The patient was taken to the recovery room.      PLAN:  She will be given Percocet as needed for pain.  She is asked to see us in 10 days for postoperative check.  Pathology is pending at this time.         SKYLA CRUZ JR, MD             D: 2017 07:47   T: 2017 13:29   MT: TS      Name:     CLAIRE KHAN   MRN:      [id number]        Account:        [id number]   :      1975           Procedure Date: 2017      Document: [id number]

## 2017-04-06 NOTE — ANESTHESIA POSTPROCEDURE EVALUATION
Patient: Claire Khan    Procedure(s):  HYSTEROSCOPY, POLYPECTOMY WITH VERSAPOINT - Wound Class: II-Clean Contaminated    Diagnosis:ENDOMETRIAL POLYP  Diagnosis Additional Information: No value filed.    Anesthesia Type:  General    Note:  Anesthesia Post Evaluation    Patient location during evaluation: PACU  Patient participation: Able to fully participate in evaluation  Level of consciousness: awake  Pain management: adequate  Airway patency: patent  Cardiovascular status: acceptable  Respiratory status: acceptable  Hydration status: acceptable  PONV: none     Anesthetic complications: None          Last vitals:  Vitals:    04/06/17 0745 04/06/17 0756 04/06/17 0800   BP: 111/67  120/79   Resp: 16 17 14   Temp:      SpO2: 99% 100% 100%         Electronically Signed By: Tam Bonner MD  April 6, 2017  8:17 AM

## 2017-04-06 NOTE — ANESTHESIA PREPROCEDURE EVALUATION
Anesthesia Evaluation     . Pt has had prior anesthetic. Type: Regional           ROS/MED HX    ENT/Pulmonary:       Neurologic:       Cardiovascular:         METS/Exercise Tolerance:     Hematologic: Comments: Factor V Leiden        Musculoskeletal:         GI/Hepatic:  - neg GI/hepatic ROS       Renal/Genitourinary:         Endo:         Psychiatric:     (+) psychiatric history anxiety and depression      Infectious Disease:         Malignancy:         Other:    (+) H/O Chronic Pain,                   Physical Exam  Normal systems: cardiovascular, pulmonary and dental    Airway   Mallampati: I  TM distance: >3 FB  Neck ROM: full    Dental     Cardiovascular   Rhythm and rate: regular and normal      Pulmonary    breath sounds clear to auscultation                    Anesthesia Plan      History & Physical Review      ASA Status:  2 .    NPO Status:  > 8 hours    Plan for General with Propofol induction. Maintenance will be TIVA.    PONV prophylaxis:  Ondansetron (or other 5HT-3) and Dexamethasone or Solumedrol       Postoperative Care  Postoperative pain management:  IV analgesics and Oral pain medications.      Consents  Anesthetic plan, risks, benefits and alternatives discussed with:  Patient..                          .

## 2017-04-06 NOTE — IP AVS SNAPSHOT
MRN:[id number]                      After Visit Summary   4/6/2017    Claire Khan    MRN: [id number]           Thank you!     Thank you for choosing [facility] for your care. Our goal is always to provide you with excellent care. Hearing back from our patients is one way we can continue to improve our services. Please take a few minutes to complete the written survey that you may receive in the mail after you visit with us. Thank you!        Patient Information     Date Of Birth          1975        About your hospital stay     You were admitted on:  April 6, 2017 You last received care in the:  [facility]    You were discharged on:  April 6, 2017       Who to Call     For medical emergencies, please call 911.  For non-urgent questions about your medical care, please call your primary care provider or clinic, [phone]  For questions related to your surgery, please call your surgery clinic        Attending Provider     Provider Sj Love MD OB/Gyn       Primary Care Provider Office Phone # Fax #    Rizwan PORTILLO Lazo [phone]       [facility] [address] [place] [address]        After Care Instructions     Discharge Instructions       Patient to arrange follow up appointment in 2  weeks                  Further instructions from your care team       While you were at the hospital today you received Toradol, an antiinflammatory medication similar to Ibuprofen.  You should not take other antiinflammatory medication, such as Ibuprofen, Motrin, Advil, Aleve, Naprosyn, etc, until 1:30 pm.         Same Day Surgery Discharge Instructions for  Sedation and General Anesthesia       It's not unusual to feel dizzy, light-headed or faint for up to 24 hours after surgery or while taking pain medication.  If you have these symptoms: sit for a few minutes before standing and have someone assist you when you get up  to walk or use the bathroom.      You should rest and relax for the next 24 hours. We recommend you make arrangements to have an adult stay with you for at least 24 hours after your discharge.  Avoid hazardous and strenuous activity.      DO NOT DRIVE any vehicle or operate mechanical equipment for 24 hours following the end of your surgery.  Even though you may feel normal, your reactions may be affected by the medication you have received.      Do not drink alcoholic beverages for 24 hours following surgery.       Slowly progress to your regular diet as you feel able. It's not unusual to feel nauseated and/or vomit after receiving anesthesia.  If you develop these symptoms, drink clear liquids (apple juice, ginger ale, broth, 7-up, etc. ) until you feel better.  If your nausea and vomiting persists for 24 hours, please notify your surgeon.        All narcotic pain medications, along with inactivity and anesthesia, can cause constipation. Drinking plenty of liquids and increasing fiber intake will help.      For any questions of a medical nature, call your surgeon.      Do not make important decisions for 24 hours.      If you had general anesthesia, you may have a sore throat for a couple of days related to the breathing tube used during surgery.  You may use Cepacol lozenges to help with this discomfort.  If it worsens or if you develop a fever, contact your surgeon.       If you feel your pain is not well managed with the pain medications prescribed by your surgeon, please contact your surgeon's office to let them know so they can address your concerns.     [facility]  Discharge Instructions  Following D & C / Hysteroscopy    Activity  You may resume normal activities including lifting as needed.  It is permissible to climb stairs. You may drive after 24 hours as long as you are not taking narcotic pain pills.  Baths or showers are perfectly acceptable.      Vaginal Discharge  You may have some  "vaginal bleeding or discharge for about a week after procedure.  You may use tampons or pads.    Temperature  If you develop temperature elevations to over 101  Fahrenheit, your physician should be called immediately.    Diet  St[place] or light diet is advisable the day of surgery.  If nausea persists, continue this diet.  If severe, call.    Follow-up  Make an appointment in 1-2 weeks if instructed to at: [phone]        [facility]  [address] [place] [address] [address]  [place] [address]  [phone]  LEE ANN Gregory MD   .        **If you have questions or concerns about your procedure,   call Dr. Samuel at [phone]**            Pending Results     No orders found from 2017 to 2017.            Admission Information     Date & Time Provider Department Dept. Phone    2017 Sj Samuel MD [facility] [phone]      Your Vitals Were     Blood Pressure Temperature Respirations Height Weight Last Period    126/87 98  F (36.7  C) (Temporal) 23 1.626 m (5' 4\") 65.3 kg (143 lb 14.4 oz) 2017    Pulse Oximetry BMI (Body Mass Index)                99% 24.7 kg/m2          MyChart Information     [employer] lets you send messages to your doctor, view your test results, renew your prescriptions, schedule appointments and more. To sign up, go to [email or web address] . Click on \"Log in\" on the left side of the screen, which will take you to the Welcome page. Then click on \"Sign up Now\" on the right side of the page.     You will be asked to enter the access code listed below, as well as some personal information. Please follow the directions to create your username and password.     Your access code is: [email or web address]  Expires: 2017  4:55 PM     Your access code will  in 90 days. If you need help or a new code, please call your [facility] clinic or [phone].        Care EveryWhere ID     This is your Care EveryWhere ID. " This could be used by other organizations to access your [facility] medical records  [id number]           Review of your medicines      START taking        Dose / Directions    oxyCODONE-acetaminophen 5-325 MG per tablet   Commonly known as:  PERCOCET   Used for:  Endometrium, polyp   Notes to Patient:  You received 1 tablet at 8:20 am.        Dose:  1-2 tablet   Take 1-2 tablets by mouth every 4 hours as needed for pain (moderate to severe)   Quantity:  10 tablet   Refills:  0         CONTINUE these medicines which have NOT CHANGED        Dose / Directions    ALPRAZolam 1 MG tablet   Commonly known as:  XANAX        TK 1 T PO QD PRN   Refills:  0       aspirin 325 MG tablet        Dose:  325 mg   Take 325 mg by mouth daily   Refills:  0       baclofen 10 MG tablet   Commonly known as:  LIORESAL        TK ONE T PO BID PRF MUSCLE SPASM   Refills:  2       FISH OIL PO        Refills:  0       MULTIVITAMIN ADULT PO        Refills:  0       oxybutynin chloride 15 MG Tb24        Dose:  15 mg   Take 15 mg by mouth as needed Reported on 3/28/2017   Refills:  0       TRAMADOL HCL PO        Dose:  50 mg   Take 50 mg by mouth   Refills:  0       TYLENOL 500 MG tablet   Generic drug:  acetaminophen        Dose:  500-1000 mg   Take 500-1,000 mg by mouth every 6 hours as needed.   Refills:  0       VITAMIN B12 PO        Refills:  0       VITAMIN D-3 PO        Refills:  0       ZOLOFT PO        Dose:  100 mg   Take 100 mg by mouth daily   Refills:  0            Where to get your medicines      Some of these will need a paper prescription and others can be bought over the counter. Ask your nurse if you have questions.     Bring a paper prescription for each of these medications     oxyCODONE-acetaminophen 5-325 MG per tablet                Protect others around you: Learn how to safely use, store and throw away your medicines at www.disposemymeds.org.             Medication List: This is a list of all your medications and when to  take them. Check marks below indicate your daily home schedule. Keep this list as a reference.      Medications           Morning Afternoon Evening Bedtime As Needed    ALPRAZolam 1 MG tablet   Commonly known as:  XANAX   TK 1 T PO QD PRN                                aspirin 325 MG tablet   Take 325 mg by mouth daily                                baclofen 10 MG tablet   Commonly known as:  LIORESAL   TK ONE T PO BID PRF MUSCLE SPASM                                FISH OIL PO                                MULTIVITAMIN ADULT PO                                oxybutynin chloride 15 MG Tb24   Take 15 mg by mouth as needed Reported on 3/28/2017                                oxyCODONE-acetaminophen 5-325 MG per tablet   Commonly known as:  PERCOCET   Take 1-2 tablets by mouth every 4 hours as needed for pain (moderate to severe)   Last time this was given:  1 tablet on 4/6/2017  8:21 AM   Notes to Patient:  You received 1 tablet at 8:20 am.                                TRAMADOL HCL PO   Take 50 mg by mouth                                TYLENOL 500 MG tablet   Take 500-1,000 mg by mouth every 6 hours as needed.   Generic drug:  acetaminophen                                VITAMIN B12 PO                                VITAMIN D-3 PO                                ZOLOFT PO   Take 100 mg by mouth daily

## 2017-04-06 NOTE — IP AVS SNAPSHOT
[facility]    [address]    [place] [address]    Phone:  [phone]    Fax:  [phone]                                       After Visit Summary   4/6/2017    Claire Khan    MRN: [id number]           After Visit Summary Signature Page     I have received my discharge instructions, and my questions have been answered. I have discussed any challenges I see with this plan with the nurse or doctor.    ..........................................................................................................................................  Patient/Patient Representative Signature      ..........................................................................................................................................  Patient Representative Print Name and Relationship to Patient    ..................................................               ................................................  Date                                            Time    ..........................................................................................................................................  Reviewed by Signature/Title    ...................................................              ..............................................  Date                                                            Time

## 2017-04-07 LAB — COPATH REPORT: NORMAL

## 2017-04-13 ENCOUNTER — TELEPHONE (OUTPATIENT)
Dept: NURSING | Facility: CLINIC | Age: 42
End: 2017-04-13

## 2017-04-13 NOTE — TELEPHONE ENCOUNTER
"Pt requesting results from 4/6/17, no result note just states \"will discuss at post-op\" pt requesting to know results now. Note routed to Carol Galicia to review and advise.   "

## 2017-04-25 ENCOUNTER — OFFICE VISIT (OUTPATIENT)
Dept: OBGYN | Facility: CLINIC | Age: 42
End: 2017-04-25
Payer: COMMERCIAL

## 2017-04-25 VITALS
HEIGHT: 64 IN | SYSTOLIC BLOOD PRESSURE: 118 MMHG | WEIGHT: 148 LBS | HEART RATE: 80 BPM | BODY MASS INDEX: 25.27 KG/M2 | DIASTOLIC BLOOD PRESSURE: 76 MMHG

## 2017-04-25 DIAGNOSIS — Z09 POSTOPERATIVE EXAMINATION: Primary | ICD-10-CM

## 2017-04-25 DIAGNOSIS — R10.2 PELVIC PAIN IN FEMALE: Primary | ICD-10-CM

## 2017-04-25 LAB — HGB BLD-MCNC: 14.3 G/DL (ref 11.7–15.7)

## 2017-04-25 PROCEDURE — 85018 HEMOGLOBIN: CPT | Performed by: OBSTETRICS & GYNECOLOGY

## 2017-04-25 PROCEDURE — 99212 OFFICE O/P EST SF 10 MIN: CPT | Performed by: OBSTETRICS & GYNECOLOGY

## 2017-04-25 PROCEDURE — 36415 COLL VENOUS BLD VENIPUNCTURE: CPT | Performed by: OBSTETRICS & GYNECOLOGY

## 2017-04-25 NOTE — PROGRESS NOTES
Patient is here today for surgical follow-up of hysteroscopy with polypectomy and endometrial curettage on 4/6 with completely benign pathology. She reported some spotting for a couple of days after the procedure, and no other bleeding since. Her lower pelvic pain has greatly improved, but still minimally present. She is wondering if this is from the fibroid. She was assured that she should give herself time to heal, and can follow-up in 6 months for evaluation. If symptoms worsens recur or worsen, she is welcome to come in sooner or call with concerns.

## 2017-04-25 NOTE — MR AVS SNAPSHOT
"              After Visit Summary   4/25/2017    Claire Khan    MRN: [id number]           Patient Information     Date Of Birth          1975        Visit Information        Provider Department      4/25/2017 4:15 PM Sj Samuel MD [facility] [place]         Follow-ups after your visit        Your next 10 appointments already scheduled     May 03, 2017  4:00 PM CDT   MA SCREENING DIGITAL BILATERAL with WEMA1   [facility] [place] ([facility] [place])    [address] [place] [address], [address]  [facility] [address] [phone]           Do not use any powder, lotion or deodorant under your arms or on your breast. If you do, we will ask you to remove it before your exam.  Wear comfortable, two-piece clothing.  If you have any allergies, tell your care team.  Bring any previous mammograms from other facilities or have them mailed to the breast center.              Who to contact     If you have questions or need follow up information about today's clinic visit or your schedule please contact [facility] [place] directly at [phone].  Normal or non-critical lab and imaging results will be communicated to you by [employer] Online[employer], letter or phone within 4 business days after the clinic has received the results. If you do not hear from us within 7 days, please contact the clinic through Quick[employer] or phone. If you have a critical or abnormal lab result, we will notify you by phone as soon as possible.  Submit refill requests through [employer] or call your pharmacy and they will forward the refill request to us. Please allow 3 business days for your refill to be completed.          Additional Information About Your Visit        [employer] Information     [employer] lets you send messages to your doctor, view your test results, renew your prescriptions, schedule appointments and more. To sign up, go to [email or web address] . Click on \"Log in\" on the left side of the " "screen, which will take you to the Welcome page. Then click on \"Sign up Now\" on the right side of the page.     You will be asked to enter the access code listed below, as well as some personal information. Please follow the directions to create your username and password.     Your access code is: [email or web address]  Expires: 2017  4:55 PM     Your access code will  in 90 days. If you need help or a new code, please call your [place] clinic or [phone].        Care EveryWhere ID     This is your Care EveryWhere ID. This could be used by other organizations to access your [place] medical records  [id number]        Your Vitals Were     Pulse Height Last Period BMI (Body Mass Index)          80 5' 4\" (1.626 m) 04/15/2017 (Exact Date) 25.4 kg/m2         Blood Pressure from Last 3 Encounters:   17 118/76   17 126/87   17 136/82    Weight from Last 3 Encounters:   17 148 lb (67.1 kg)   17 143 lb 14.4 oz (65.3 kg)   17 146 lb (66.2 kg)              Today, you had the following     No orders found for display       Primary Care Provider Office Phone # Fax #    Rizwan Lazo [phone]       [facility] [address] [place] [address]        Thank you!     Thank you for choosing [facility]  for your care. Our goal is always to provide you with excellent care. Hearing back from our patients is one way we can continue to improve our services. Please take a few minutes to complete the written survey that you may receive in the mail after your visit with us. Thank you!             Your Updated Medication List - Protect others around you: Learn how to safely use, store and throw away your medicines at www.disposemymeds.org.          This list is accurate as of: 17  4:37 PM.  Always use your most recent med list.                   Brand Name Dispense Instructions for use    ALPRAZolam 1 MG tablet    XANAX     TK 1 T " PO QD PRN       aspirin 325 MG tablet      Take 325 mg by mouth daily       baclofen 10 MG tablet    LIORESAL     TK ONE T PO BID PRF MUSCLE SPASM       FISH OIL PO          MULTIVITAMIN ADULT PO          oxybutynin chloride 15 MG Tb24      Take 15 mg by mouth as needed Reported on 4/25/2017       oxyCODONE-acetaminophen 5-325 MG per tablet    PERCOCET    10 tablet    Take 1-2 tablets by mouth every 4 hours as needed for pain (moderate to severe)       TRAMADOL HCL PO      Take 50 mg by mouth       TYLENOL 500 MG tablet   Generic drug:  acetaminophen      Take 500-1,000 mg by mouth every 6 hours as needed.       VITAMIN B12 PO          VITAMIN D-3 PO          ZOLOFT PO      Take 100 mg by mouth daily

## 2017-05-03 ENCOUNTER — RADIANT APPOINTMENT (OUTPATIENT)
Dept: MAMMOGRAPHY | Facility: CLINIC | Age: 42
End: 2017-05-03
Payer: COMMERCIAL

## 2017-05-03 DIAGNOSIS — Z12.31 VISIT FOR SCREENING MAMMOGRAM: ICD-10-CM

## 2017-05-03 PROCEDURE — G0202 SCR MAMMO BI INCL CAD: HCPCS | Mod: TC

## 2017-11-13 ENCOUNTER — OFFICE VISIT (OUTPATIENT)
Dept: OBGYN | Facility: CLINIC | Age: 42
End: 2017-11-13
Payer: COMMERCIAL

## 2017-11-13 VITALS
BODY MASS INDEX: 25.81 KG/M2 | WEIGHT: 151.2 LBS | HEIGHT: 64 IN | DIASTOLIC BLOOD PRESSURE: 78 MMHG | SYSTOLIC BLOOD PRESSURE: 112 MMHG

## 2017-11-13 DIAGNOSIS — D21.9 MYOMA: Primary | ICD-10-CM

## 2017-11-13 PROCEDURE — 99213 OFFICE O/P EST LOW 20 MIN: CPT | Performed by: OBSTETRICS & GYNECOLOGY

## 2017-11-13 NOTE — MR AVS SNAPSHOT
"              After Visit Summary   11/13/2017    Claire Khan    MRN: [id number]           Patient Information     Date Of Birth          1975        Visit Information        Provider Department      11/13/2017 4:15 PM Sj Samuel MD [facility]        Today's Diagnoses     Myoma    -  1       Follow-ups after your visit        Your next 10 appointments already scheduled     May 07, 2018  3:45 PM CDT   MA SCREENING DIGITAL BILATERAL with WEMA1   [facility] [place] ([facility] [place])    [address] [place] [address], [address]  [place] [id number] [phone]           Do not use any powder, lotion or deodorant under your arms or on your breast. If you do, we will ask you to remove it before your exam.  Wear comfortable, two-piece clothing.  If you have any allergies, tell your care team.  Bring any previous mammograms from other facilities or have them mailed to the breast center. Three-dimensional (3D) mammograms are available at [facility] locations in [facility], [facility], [facility], and [place]. [facility] locations include [place] [facility] in [place]. Benefits of 3D mammograms include: - Improved rate of cancer detection - Decreases your chance of having to go back for more tests, which means fewer: - \"False-positive\" results (This means that there is an abnormal area but it isn't cancer.) - Invasive testing procedures, such as a biopsy or surgery - Can provide clearer images of the breast if you have dense breast tissue. 3D mammography is an optional exam that anyone can have with a 2D mammogram. It doesn't replace or take the place of a 2D mammogram. 2D mammograms remain an effective screening test for all women.  Not all insurance companies cover the cost of a 3D mammogram. Check with your insurance.            May 07, 2018  4:00 PM CDT   SHORT with Sj Samuel MD   [facility] " [facility] [place] [facility]    [address] [id number] [phone]              Who to contact     If you have questions or need follow up information about today's clinic visit or your schedule please contact [facility] [place] directly at [phone].  Normal or non-critical lab and imaging results will be communicated to you by MyChart, letter or phone within 4 business days after the clinic has received the results. If you do not hear from us within 7 days, please contact the clinic through [employer] or phone. If you have a critical or abnormal lab result, we will notify you by phone as soon as possible.  Submit refill requests through "[employer] or call your pharmacy and they will forward the refill request to us. Please allow 3 business days for your refill to be completed.          Additional Information About Your Visit        "[employer] Information     "[employer] lets you send messages to your doctor, view your test results, renew your prescriptions, schedule appointments and more. To sign up, go to [email or web address] . Click on \"Log in\" on the left side of the screen, which will take you to the Welcome page. Then click on \"Sign up Now\" on the right side of the page.     You will be asked to enter the access code listed below, as well as some personal information. Please follow the directions to create your username and password.     Your access code is: [email or web address]  Expires: 2018  4:18 PM     Your access code will  in 90 days. If you need help or a new code, please call your [place] clinic or [phone].        Care EveryWhere ID     This is your Care EveryWhere ID. This could be used by other organizations to access your [facility] medical records  [id number]        Your Vitals Were     Height Last Period BMI (Body Mass Index)             5' 4\" (1.626 m) 2017 (Exact Date) 25.95 kg/m2          Blood Pressure from " Last 3 Encounters:   11/13/17 112/78   04/25/17 118/76   04/06/17 126/87    Weight from Last 3 Encounters:   11/13/17 151 lb 3.2 oz (68.6 kg)   04/25/17 148 lb (67.1 kg)   04/06/17 143 lb 14.4 oz (65.3 kg)              Today, you had the following     No orders found for display       Primary Care Provider Office Phone # Fax #    Rizwan Lazo [phone]       [facility] [address] [place] [address]        Equal Access to Services     [facility]: Hadii aad ku hadasho Soomaali, waaxda luqadaha, qaybta kaalmada adeegyada, waxtonny sanabrian emmie guy . So [facility] [phone].    ATENCIÓN: Si habla español, tiene a krause disposición servicios gratuitos de asistencia lingüística. Llame al [phone].    We comply with applicable federal civil rights laws and [place] laws. We do not discriminate on the basis of race, color, national origin, age, disability, sex, sexual orientation, or gender identity.            Thank you!     Thank you for choosing [facility] [place]  for your care. Our goal is always to provide you with excellent care. Hearing back from our patients is one way we can continue to improve our services. Please take a few minutes to complete the written survey that you may receive in the mail after your visit with us. Thank you!             Your Updated Medication List - Protect others around you: Learn how to safely use, store and throw away your medicines at www.disposemymeds.org.          This list is accurate as of: 11/13/17  4:21 PM.  Always use your most recent med list.                   Brand Name Dispense Instructions for use Diagnosis    ALPRAZolam 1 MG tablet    XANAX     TK 1 T PO QD PRN        aspirin 325 MG tablet      Take 325 mg by mouth daily        baclofen 10 MG tablet    LIORESAL     TK ONE T PO BID PRF MUSCLE SPASM        MULTIVITAMIN ADULT PO           TRAMADOL HCL PO      Take 50 mg by mouth        TYLENOL 500 MG  tablet   Generic drug:  acetaminophen      Take 500-1,000 mg by mouth every 6 hours as needed.        VITAMIN B12 PO           VITAMIN D-3 PO           ZOLOFT PO      Take 100 mg by mouth daily

## 2017-11-13 NOTE — PROGRESS NOTES
SUBJECTIVE:                                                   Claire Khan is a 42 year old female who presents to clinic today for the following health issue(s):  Patient presents with:  Follow Up For: follow-up of hysteroscopy with polypectomy and endometrial curettage done in April. Patient states feels better since surgery but still has some feeling of pressure        HPI: The patient is seen at this time in follow-up of heavy bleeding that was irregular. Since her surgery she has had regular cycles that can be 4-5 days and are fairly light. She has had only one irregular that came early. She complains of occasional cramping sensation in the uterus but it does not appear to exactly lined up with her menses.      Patient's last menstrual period was 11/08/2017 (exact date)..   Patient is sexually active, No obstetric history on file..  Using condoms for contraception.    reports that she has never smoked. She has never used smokeless tobacco.      STD testing offered?  Declined    Health maintenance updated:  yes    Today's PHQ-2 Score:   PHQ-2 ( 1999 Pfizer) 4/25/2017   Q1: Little interest or pleasure in doing things 1   Q2: Feeling down, depressed or hopeless 0   PHQ-2 Score 1     Today's PHQ-9 Score: No flowsheet data found.  Today's PINA-7 Score: No flowsheet data found.    Problem list and histories reviewed & adjusted, as indicated.  Additional history: as documented.    Patient Active Problem List   Diagnosis   (none) - all problems resolved or deleted     Past Surgical History:   Procedure Laterality Date     ARTHROSCOPY KNEE WITH PATELLAR REALIGNMENT  6/29/2012    Procedure: ARTHROSCOPY KNEE WITH PATELLAR REALIGNMENT;  Right Knee Arthroscopy, Right Medial Patellar Femoral Ligament  Reconstruction with Graft, Partial Lateral Facetectomy, Lateral Retinacular Lengthening  ;  Surgeon: Rain Noe MD;  [facility]     ARTHROSCOPY KNEE WITH RETINACULAR RELEASE  5/30/2014    Procedure:  "ARTHROSCOPY KNEE WITH RETINACULAR RELEASE MEDIAL OR LATERAL;  Surgeon: Rain Noe MD;  [facility]     EXTERNAL EAR SURGERY       HYSTEROSCOPY, ABLATE ENDOMETRIUM VERSAPOINT , COMBINED N/A 4/6/2017    Procedure: COMBINED HYSTEROSCOPY, ABLATE ENDOMETRIUM VERSAPOINT;  Surgeon: Sj Samuel MD;  Location: [facility]     PARTIAL HYMENECTOMY/REVISION HYMENAL RING       WISDOM TEETH[        Social History   Substance Use Topics     Smoking status: Never Smoker     Smokeless tobacco: Never Used     Alcohol use Yes      Comment: 1 DRINK PER MONTH      Problem (# of Occurrences) Relation (Name,Age of Onset)    C.A.D. (1) Mother    DIABETES (1) Father            Current Outpatient Prescriptions   Medication Sig     Multiple Vitamins-Minerals (MULTIVITAMIN ADULT PO)      Cyanocobalamin (VITAMIN B12 PO)      Cholecalciferol (VITAMIN D-3 PO)      TRAMADOL HCL PO Take 50 mg by mouth     ALPRAZolam (XANAX) 1 MG tablet TK 1 T PO QD PRN     baclofen (LIORESAL) 10 MG tablet TK ONE T PO BID PRF MUSCLE SPASM     aspirin 325 MG tablet Take 325 mg by mouth daily     acetaminophen (TYLENOL) 500 MG tablet Take 500-1,000 mg by mouth every 6 hours as needed.     Sertraline HCl (ZOLOFT PO) Take 100 mg by mouth daily      No current facility-administered medications for this visit.      Allergies   Allergen Reactions     Bactrim      Blisters or  sores on tongue     Paxil [Paroxetine]      Panic attack     Sulfa Drugs Unknown     Mouth Sores     Vicodin [Hydrocodone-Acetaminophen]      anxiety       ROS:  12 point review of systems negative other than symptoms noted below.  Constitutional: Fatigue  Genitourinary: Hot Flashes and Significant PMS  Neurologic: Headaches  Musculoskeletal: Joint Pain  Endocrine: Decreased Libido  Psychiatric: Anxiety, Depression, Difficulty Sleeping and Martinez    OBJECTIVE:     /78  Ht 5' 4\" (1.626 m)  Wt 151 lb 3.2 oz (68.6 kg)  LMP 11/08/2017 (Exact Date)  BMI 25.95 kg/m2  Body mass index " is 25.95 kg/(m^2).    Exam:  Constitutional:  Appearance: Well nourished, well developed alert, in no acute distress  Gastrointestinal:  Abdominal Examination:  Abdomen nontender to palpation, tone normal without rigidity or guarding, no masses present, umbilicus without lesions; Liver/Spleen:  No hepatomegaly present, liver nontender to palpation; Hernias:  No hernias present  Lymphatic: Lymph Nodes:  No other lymphadenopathy present  Skin:General Inspection:  No rashes present, no lesions present, no areas of discoloration; Genitalia and Groin:  No rashes present, no lesions present, no areas of discoloration, no masses present.  Neurologic/Psychiatric:  Mental Status:  Oriented X3   Pelvic Exam:  External Genitalia:     Normal appearance for age, no discharge present, no tenderness present, no inflammatory lesions present, color normal  Vagina:     Normal vaginal vault without central or paravaginal defects, no discharge present, no inflammatory lesions present, no masses present  Bladder:     Nontender to palpation  Urethra:   Urethral Body:  Urethra palpation normal, urethra structural support normal   Urethral Meatus:  No erythema or lesions present  Cervix:     Appearance healthy, no lesions present, nontender to palpation, no bleeding present  Uterus:     Uterus: firm, normal sized and nontender, midplane in position.   Adnexa:     No adnexal tenderness present, no adnexal masses present  Perineum:     Perineum within normal limits, no evidence of trauma, no rashes or skin lesions present  Anus:     Anus within normal limits, no hemorrhoids present  Inguinal Lymph Nodes:     No lymphadenopathy present  Pubic Hair:     Normal pubic hair distribution for age  Genitalia and Groin:     No rashes present, no lesions present, no areas of discoloration, no masses present       In-Clinic Test Results:      ASSESSMENT/PLAN:                                                      Patient with history of small fibroid and  irregular bleeding in the past. Her IUD is then removed and she is using condoms for contraception. Her examination is unremarkable and I do not think her uterus is increased in size. We have asked her to track her bleeding carefully and if she bleeds heavily to contact us. Otherwise we'll see her in 4-6 months.    Sj Samuel MD  [facility] FOR [facility]

## 2018-05-07 ENCOUNTER — OFFICE VISIT (OUTPATIENT)
Dept: OBGYN | Facility: CLINIC | Age: 43
End: 2018-05-07
Payer: COMMERCIAL

## 2018-05-07 ENCOUNTER — RADIANT APPOINTMENT (OUTPATIENT)
Dept: MAMMOGRAPHY | Facility: CLINIC | Age: 43
End: 2018-05-07
Payer: COMMERCIAL

## 2018-05-07 VITALS
BODY MASS INDEX: 26.12 KG/M2 | DIASTOLIC BLOOD PRESSURE: 76 MMHG | HEIGHT: 64 IN | SYSTOLIC BLOOD PRESSURE: 118 MMHG | HEART RATE: 68 BPM | WEIGHT: 153 LBS

## 2018-05-07 DIAGNOSIS — N84.0 ENDOMETRIAL POLYP: Primary | ICD-10-CM

## 2018-05-07 DIAGNOSIS — Z12.31 VISIT FOR SCREENING MAMMOGRAM: ICD-10-CM

## 2018-05-07 PROCEDURE — 99212 OFFICE O/P EST SF 10 MIN: CPT | Performed by: OBSTETRICS & GYNECOLOGY

## 2018-05-07 PROCEDURE — 77067 SCR MAMMO BI INCL CAD: CPT | Mod: TC

## 2018-05-07 NOTE — PROGRESS NOTES
SUBJECTIVE:                                                   Claire Khan is a 43 year old female who presents to clinic today for the following health issue(s):  Patient presents with:  Surgical Followup: f/u to bleeding and fibroid after having hysteroscopy w/ polypectomy- pt states bleeding has improved, cramps are still bad        HPI: The patient is seen at this time for follow-up of menorrhagia secondary to 2 large polyps that were removed in April 2017.  Her menses are now 4 days and 2 months ago she had a debilitating day and a half worth of severe cramping.      Patient's last menstrual period was 05/01/2018 (exact date)..   Patient is sexually active, No obstetric history on file..  Using condoms for contraception.    reports that she has never smoked. She has never used smokeless tobacco.    STD testing offered?  Declined    Health maintenance updated:  yes    Problem list and histories reviewed & adjusted, as indicated.  Additional history: as documented.    Patient Active Problem List   Diagnosis   (none) - all problems resolved or deleted     Past Surgical History:   Procedure Laterality Date     ARTHROSCOPY KNEE WITH PATELLAR REALIGNMENT  6/29/2012    Procedure: ARTHROSCOPY KNEE WITH PATELLAR REALIGNMENT;  Right Knee Arthroscopy, Right Medial Patellar Femoral Ligament  Reconstruction with Graft, Partial Lateral Facetectomy, Lateral Retinacular Lengthening  ;  Surgeon: Rain Noe MD;  Location:  OR     ARTHROSCOPY KNEE WITH RETINACULAR RELEASE  5/30/2014    Procedure: ARTHROSCOPY KNEE WITH RETINACULAR RELEASE MEDIAL OR LATERAL;  Surgeon: Rain Noe MD;  Location:  OR     EXTERNAL EAR SURGERY       HYSTEROSCOPY, ABLATE ENDOMETRIUM VERSAPOINT , COMBINED N/A 4/6/2017    Procedure: COMBINED HYSTEROSCOPY, ABLATE ENDOMETRIUM VERSAPOINT;  Surgeon: Sj Samuel MD;  Location: [facility]     PARTIAL HYMENECTOMY/REVISION HYMENAL RING       WISDOM TEETH[        Social History  "  Substance Use Topics     Smoking status: Never Smoker     Smokeless tobacco: Never Used     Alcohol use Yes      Comment: 1 DRINK PER MONTH      Problem (# of Occurrences) Relation (Name,Age of Onset)    C.A.D. (1) Mother    DIABETES (1) Father            Current Outpatient Prescriptions   Medication Sig     ALPRAZolam (XANAX) 1 MG tablet TK 1 T PO QD PRN     aspirin 325 MG tablet Take 325 mg by mouth daily     baclofen (LIORESAL) 10 MG tablet TK ONE T PO BID PRF MUSCLE SPASM     Cholecalciferol (VITAMIN D-3 PO)      Cyanocobalamin (VITAMIN B12 PO)      Multiple Vitamins-Minerals (MULTIVITAMIN ADULT PO)      Sertraline HCl (ZOLOFT PO) Take 100 mg by mouth daily      TRAMADOL HCL PO Take 50 mg by mouth as needed      No current facility-administered medications for this visit.      Allergies   Allergen Reactions     Bactrim      Blisters or  sores on tongue     Paxil [Paroxetine]      Panic attack     Sulfa Drugs Unknown     Mouth Sores     Vicodin [Hydrocodone-Acetaminophen]      anxiety       ROS:  12 point review of systems negative other than symptoms noted below.  Genitourinary: Cramps, Night Sweats and Significant PMS  Musculoskeletal: Joint Pain  Endocrine: Decreased Libido  Psychiatric: Anxiety, Depression and Martinez    OBJECTIVE:     /76  Pulse 68  Ht 5' 4\" (1.626 m)  Wt 153 lb (69.4 kg)  LMP 05/01/2018 (Exact Date)  BMI 26.26 kg/m2  Body mass index is 26.26 kg/(m^2).    Exam:  Constitutional:  Appearance: Well nourished, well developed alert, in no acute distress  Gastrointestinal:  Abdominal Examination:  Abdomen nontender to palpation, tone normal without rigidity or guarding, no masses present, umbilicus without lesions; Liver/Spleen:  No hepatomegaly present, liver nontender to palpation; Hernias:  No hernias present  Lymphatic: Lymph Nodes:  No other lymphadenopathy present  Skin:General Inspection:  No rashes present, no lesions present, no areas of discoloration; Genitalia and Groin:  No " rashes present, no lesions present, no areas of discoloration, no masses present.  Neurologic/Psychiatric:  Mental Status:  Oriented X3   Pelvic Exam:  External Genitalia:     Normal appearance for age, no discharge present, no tenderness present, no inflammatory lesions present, color normal  Vagina:     Normal vaginal vault without central or paravaginal defects, no discharge present, no inflammatory lesions present, no masses present  Bladder:     Nontender to palpation  Urethra:   Urethral Body:  Urethra palpation normal, urethra structural support normal   Urethral Meatus:  No erythema or lesions present  Cervix:     Appearance healthy, no lesions present, nontender to palpation, no bleeding present  Uterus:     Uterus: firm, normal sized and nontender, midplane in position.   Adnexa:     No adnexal tenderness present, no adnexal masses present  Perineum:     Perineum within normal limits, no evidence of trauma, no rashes or skin lesions present  Anus:     Anus within normal limits, no hemorrhoids present  Inguinal Lymph Nodes:     No lymphadenopathy present  Pubic Hair:     Normal pubic hair distribution for age  Genitalia and Groin:     No rashes present, no lesions present, no areas of discoloration, no masses present       In-Clinic Test Results:      ASSESSMENT/PLAN:                                                        43-year-old patient with history of menorrhagia secondary to endometrial polyps with good outcome from her hysteroscopy and polypectomy.  Her menses are now 4 days but she did have one severe cycle of dysmenorrhea.  We discussed the options of continuous suppression with her and this was denied.  We will see her back in 6 months and asked her to call if things get worse.          Sj Samuel MD  [facility] [place]

## 2018-05-07 NOTE — MR AVS SNAPSHOT
"              After Visit Summary   5/7/2018    Claire Khan    MRN: [id number]           Patient Information     Date Of Birth          1975        Visit Information        Provider Department      5/7/2018 4:00 PM Sj Samuel MD [facility]        Today's Diagnoses     Endometrial polyp    -  1       Follow-ups after your visit        Your next 10 appointments already scheduled     Nov 05, 2018  4:00 PM CST   SHORT with Sj Samuel MD   [facility] ([facility])    [address] [place] [id number] [phone]              Who to contact     If you have questions or need follow up information about today's clinic visit or your schedule please contact [facility] directly at [phone].  Normal or non-critical lab and imaging results will be communicated to you by [employer], letter or phone within 4 business days after the clinic has received the results. If you do not hear from us within 7 days, please contact the clinic through [employer] or phone. If you have a critical or abnormal lab result, we will notify you by phone as soon as possible.  Submit refill requests through [employer] or call your pharmacy and they will forward the refill request to us. Please allow 3 business days for your refill to be completed.          Additional Information About Your Visit        MyChart Information     [employer]This lets you send messages to your doctor, view your test results, renew your prescriptions, schedule appointments and more. To sign up, go to [email or web address] . Click on \"Log in\" on the left side of the screen, which will take you to the Welcome page. Then click on \"Sign up Now\" on the right side of the page.     You will be asked to enter the access code listed below, as well as some personal information. Please follow the directions to create your username and password.     Your access " "code is: [email or web address]  Expires: 2018  4:20 PM     Your access code will  in 90 days. If you need help or a new code, please call your [facility] clinic or [phone].        Care EveryWhere ID     This is your Care EveryWhere ID. This could be used by other organizations to access your [facility] medical records  [id number]        Your Vitals Were     Pulse Height Last Period BMI (Body Mass Index)          68 5' 4\" (1.626 m) 2018 (Exact Date) 26.26 kg/m2         Blood Pressure from Last 3 Encounters:   18 118/76   17 112/78   17 118/76    Weight from Last 3 Encounters:   18 153 lb (69.4 kg)   17 151 lb 3.2 oz (68.6 kg)   17 148 lb (67.1 kg)              Today, you had the following     No orders found for display       Primary Care Provider Office Phone # Fax #    Rizwan BANDAR Lazo [phone]       [facility] [address] [place] [address]        Equal Access to Services     NAKIA LEMUS AH: Hadii brendon kingsley hadasho Soomaali, waaxda luqadaha, qaybta kaalmada adeegyada, stuart barragan. So [facility] [phone].    ATENCIÓN: Si habla español, tiene a krause disposición servicios gratuitos de asistencia lingüística. Llame al [phone].    We comply with applicable federal civil rights laws and [place] laws. We do not discriminate on the basis of race, color, national origin, age, disability, sex, sexual orientation, or gender identity.            Thank you!     Thank you for choosing [facility]  for your care. Our goal is always to provide you with excellent care. Hearing back from our patients is one way we can continue to improve our services. Please take a few minutes to complete the written survey that you may receive in the mail after your visit with us. Thank you!             Your Updated Medication List - Protect others around you: Learn how to safely use, store and throw away your " medicines at www.disposemymeds.org.          This list is accurate as of 5/7/18  4:20 PM.  Always use your most recent med list.                   Brand Name Dispense Instructions for use Diagnosis    ALPRAZolam 1 MG tablet    XANAX     TK 1 T PO QD PRN        aspirin 325 MG tablet      Take 325 mg by mouth daily        baclofen 10 MG tablet    LIORESAL     TK ONE T PO BID PRF MUSCLE SPASM        MULTIVITAMIN ADULT PO           TRAMADOL HCL PO      Take 50 mg by mouth as needed        VITAMIN B12 PO           VITAMIN D-3 PO           ZOLOFT PO      Take 100 mg by mouth daily

## 2018-05-29 ENCOUNTER — TELEPHONE (OUTPATIENT)
Dept: NURSING | Facility: CLINIC | Age: 43
End: 2018-05-29

## 2018-05-29 DIAGNOSIS — R10.2 PELVIC PAIN IN FEMALE: Primary | ICD-10-CM

## 2018-05-29 NOTE — TELEPHONE ENCOUNTER
Wants to leave message for Dr. Samuel  LMP started 5/26/18, cramping intense this period. Was seen in clinic 5/7/2018 and EB told her to call if cramping becoming worse with periods. She had a couple of Percocet left from previous surgery and took one with her period in March and then one this last weekend for intense cramping. Taking about 5 ES Tylenol and 6 Ibuprofen throughout each day and still very painful. Wondering if she can get something Rx for pain or what he wants her to do. She is at work today and might even go home, cramping persists.    Will route to Carol Galicia NP for Dr. Samuel to advise. Pt at work and you can leave detailed message: #[phone]. [facility] for preferred pharmacy if applicable. Sensitivity to Hydrocodone.    5/7/18:  43-year-old patient with history of menorrhagia secondary to endometrial polyps with good outcome from her hysteroscopy and polypectomy.  Her menses are now 4 days but she did have one severe cycle of dysmenorrhea.  We discussed the options of continuous suppression with her and this was denied.  We will see her back in 6 months and asked her to call if things get worse.     Sj Smauel MD  [facility] ARROYO RN

## 2018-05-29 NOTE — TELEPHONE ENCOUNTER
Discussed with EB.     Have her come in for an ultrasound with Cecil, then see Dr. Samuel after.    US order placed.

## 2018-05-29 NOTE — TELEPHONE ENCOUNTER
Called pt with EB's advice to have an U/S followed by a visit with EB. Pt verbalized understanding and was transferred to scheduling to make appt. Appt made for 6/11/2018.  Carol ARROYO RN

## 2018-06-11 ENCOUNTER — RADIANT APPOINTMENT (OUTPATIENT)
Dept: ULTRASOUND IMAGING | Facility: CLINIC | Age: 43
End: 2018-06-11
Payer: COMMERCIAL

## 2018-06-11 ENCOUNTER — OFFICE VISIT (OUTPATIENT)
Dept: OBGYN | Facility: CLINIC | Age: 43
End: 2018-06-11
Payer: COMMERCIAL

## 2018-06-11 VITALS — WEIGHT: 153 LBS | DIASTOLIC BLOOD PRESSURE: 80 MMHG | BODY MASS INDEX: 26.26 KG/M2 | SYSTOLIC BLOOD PRESSURE: 136 MMHG

## 2018-06-11 DIAGNOSIS — N94.6 DYSMENORRHEA: Primary | ICD-10-CM

## 2018-06-11 DIAGNOSIS — R10.2 PELVIC PAIN IN FEMALE: ICD-10-CM

## 2018-06-11 PROCEDURE — 76830 TRANSVAGINAL US NON-OB: CPT | Performed by: OBSTETRICS & GYNECOLOGY

## 2018-06-11 PROCEDURE — 99213 OFFICE O/P EST LOW 20 MIN: CPT | Performed by: OBSTETRICS & GYNECOLOGY

## 2018-06-11 RX ORDER — HYDROCODONE BITARTRATE AND ACETAMINOPHEN 5; 325 MG/1; MG/1
1 TABLET ORAL EVERY 4 HOURS PRN
Qty: 18 TABLET | Refills: 0 | Status: ON HOLD | OUTPATIENT
Start: 2018-06-11 | End: 2018-12-11

## 2018-06-11 NOTE — PROGRESS NOTES
SUBJECTIVE:                                                   Claire Khan is a 43 year old female who presents to clinic today for the following health issue(s):  Patient presents with:  Ultrasound        HPI: Patient seen in follow-up of history of polyps removed in April 2017.  She has severe dysmenorrhea again.  Her cycles last 3-4 days and although she is not bleeding heavily she has severe pain.  Ultrasound is performed today      No LMP recorded..   Patient is sexually active, No obstetric history on file..  Using condoms for contraception.    reports that she has never smoked. She has never used smokeless tobacco.    STD testing offered?  Declined    Health maintenance updated:  yes    Today's PHQ-2 Score:   PHQ-2 ( 1999 Pf[employer]) 4/25/2017   Q1: Little interest or pleasure in doing things 1   Q2: Feeling down, depressed or hopeless 0   PHQ-2 Score 1     Today's PHQ-9 Score: No flowsheet data found.  Today's PINA-7 Score: No flowsheet data found.    Problem list and histories reviewed & adjusted, as indicated.  Additional history: as documented.    Patient Active Problem List   Diagnosis   (none) - all problems resolved or deleted     Past Surgical History:   Procedure Laterality Date     ARTHROSCOPY KNEE WITH PATELLAR REALIGNMENT  6/29/2012    Procedure: ARTHROSCOPY KNEE WITH PATELLAR REALIGNMENT;  Right Knee Arthroscopy, Right Medial Patellar Femoral Ligament  Reconstruction with Graft, Partial Lateral Facetectomy, Lateral Retinacular Lengthening  ;  Surgeon: Rain Noe MD;  Location:  OR     ARTHROSCOPY KNEE WITH RETINACULAR RELEASE  5/30/2014    Procedure: ARTHROSCOPY KNEE WITH RETINACULAR RELEASE MEDIAL OR LATERAL;  Surgeon: Rain Noe MD;  Location:  OR     EXTERNAL EAR SURGERY       HYSTEROSCOPY, ABLATE ENDOMETRIUM VERSAPOINT , COMBINED N/A 4/6/2017    Procedure: COMBINED HYSTEROSCOPY, ABLATE ENDOMETRIUM VERSAPOINT;  Surgeon: Sj Samuel MD;  Location: [facility]      PARTIAL HYMENECTOMY/REVISION HYMENAL RING       WISDOM TEETH[        Social History   Substance Use Topics     Smoking status: Never Smoker     Smokeless tobacco: Never Used     Alcohol use Yes      Comment: 1 DRINK PER MONTH      Problem (# of Occurrences) Relation (Name,Age of Onset)    C.A.D. (1) Mother    DIABETES (1) Father            Current Outpatient Prescriptions   Medication Sig     ALPRAZolam (XANAX) 1 MG tablet TK 1 T PO QD PRN     aspirin 325 MG tablet Take 325 mg by mouth daily     baclofen (LIORESAL) 10 MG tablet TK ONE T PO BID PRF MUSCLE SPASM     Cholecalciferol (VITAMIN D-3 PO)      Cyanocobalamin (VITAMIN B12 PO)      Multiple Vitamins-Minerals (MULTIVITAMIN ADULT PO)      Sertraline HCl (ZOLOFT PO) Take 100 mg by mouth daily      TRAMADOL HCL PO Take 50 mg by mouth as needed      No current facility-administered medications for this visit.      Allergies   Allergen Reactions     Bactrim      Blisters or  sores on tongue     Paxil [Paroxetine]      Panic attack     Sulfa Drugs Unknown     Mouth Sores     Vicodin [Hydrocodone-Acetaminophen]      anxiety       ROS:  Genitourinary: Cramps    OBJECTIVE:     /80  Wt 153 lb (69.4 kg)  BMI 26.26 kg/m2  Body mass index is 26.26 kg/(m^2).    Exam:  Constitutional:  Appearance: Well nourished, well developed alert, in no acute distress     In-Clinic Test Results:  Results for orders placed or performed in visit on 06/11/18   US Transvaginal Non OB    Narrative    US Transvaginal Non OB    Order #: [id number] Accession #: [id number]         Study Notes        Jennifer Louise on 6/11/2018  3:34 PM     Gynecological Ultrasound Report  Pelvic U/S - Transvaginal    [facility] for [facility]  Referring Provider: DR CRUZ  Sonographer:  JENNIFER LOUISE  Indication: Severe cramping  LMP: No LMP recorded.  History: Polypectomy  Gynecological Ultrasonography:   Uterus: anteverted  Size: 7.30 x 5.54 x 4.89cm  Findings: Single anterior fibroid  measures 2.83 x 2.62cm versus 3.5 x   3.1cm on previous ultrasound. Uterus appears otherwise normal.   Endometrium: Thickness total 8.44mm  Findings: 3D views show possibly two 9mm polyps.  Right Ovary: 4.17  x 2.60 x 2.69cm  Findings: Single 2.60 x 2.13cm right ovarian resolving corpus luteum cyst   (by color doppler) with free fluid glenis-adnexa to posterior uterine   cul-de-sac.   Left Ovary: 2.79 x 1.59 x 2.10cm  Cul de Sac/Pouch of Mahesh: clear free fluid noted      Impression: Patient with 2 polyps and a resolving corpus luteum cyst on   the right     SONOGRAPHER NOTES TO READING PROVIDER:   Possibly two endometrial cavity   polyps. Resolving right ovarian corpus luteum cyst. Anterior fibroid   identified.                Discussed here          ASSESSMENT/PLAN:                                                      Patient with severe dysmenorrhea and recurrence of 2 polyps.  We have given her the option of observation versus hysteroscopy with polypectomy and endometrial ablation.  The patient has a vacation coming up and we will          Sj Samuel MD  [facility] [place]

## 2018-06-11 NOTE — MR AVS SNAPSHOT
"              After Visit Summary   6/11/2018    Claire Khan    MRN: [id number]           Patient Information     Date Of Birth          1975        Visit Information        Provider Department      6/11/2018 3:45 PM Sj Samuel MD [facility]        Today's Diagnoses     Dysmenorrhea    -  1       Follow-ups after your visit        Your next 10 appointments already scheduled     Nov 05, 2018  4:00 PM CST   SHORT with Sj Samuel MD   [facility] ([facility])    [address] [place] [id number] [phone]              Who to contact     If you have questions or need follow up information about today's clinic visit or your schedule please contact [facility] directly at [phone].  Normal or non-critical lab and imaging results will be communicated to you by SkyGiraffehart, letter or phone within 4 business days after the clinic has received the results. If you do not hear from us within 7 days, please contact the clinic through SkyGiraffehart or phone. If you have a critical or abnormal lab result, we will notify you by phone as soon as possible.  Submit refill requests through [employer] or call your pharmacy and they will forward the refill request to us. Please allow 3 business days for your refill to be completed.          Additional Information About Your Visit        MyChart Information     [employer] lets you send messages to your doctor, view your test results, renew your prescriptions, schedule appointments and more. To sign up, go to [email or web address] . Click on \"Log in\" on the left side of the screen, which will take you to the Welcome page. Then click on \"Sign up Now\" on the right side of the page.     You will be asked to enter the access code listed below, as well as some personal information. Please follow the directions to create your username and password.     Your access code " is: [email or web address]  Expires: 2018  2:46 PM     Your access code will  in 90 days. If you need help or a new code, please call your [place] clinic or [phone].        Care EveryWhere ID     This is your Care EveryWhere ID. This could be used by other organizations to access your [place] medical records  [id number]        Your Vitals Were     Last Period Breastfeeding? BMI (Body Mass Index)             2018 (Approximate) No 26.26 kg/m2          Blood Pressure from Last 3 Encounters:   18 136/80   18 118/76   17 112/78    Weight from Last 3 Encounters:   18 153 lb (69.4 kg)   18 153 lb (69.4 kg)   17 151 lb 3.2 oz (68.6 kg)              Today, you had the following     No orders found for display         Today's Medication Changes          These changes are accurate as of 18  4:20 PM.  If you have any questions, ask your nurse or doctor.               Start taking these medicines.        Dose/Directions    HYDROcodone-acetaminophen 5-325 MG per tablet   Commonly known as:  NORCO   Used for:  Dysmenorrhea   Started by:  Sj Samuel MD        Dose:  1 tablet   Take 1 tablet by mouth every 4 hours as needed for pain   Quantity:  18 tablet   Refills:  0            Where to get your medicines      Some of these will need a paper prescription and others can be bought over the counter.  Ask your nurse if you have questions.     Bring a paper prescription for each of these medications     HYDROcodone-acetaminophen 5-325 MG per tablet               Information about OPIOIDS     PRESCRIPTION OPIOIDS: WHAT YOU NEED TO KNOW   You have a prescription for an opioid (narcotic) pain medicine. Opioids can cause addiction. If you have a history of chemical dependency of any type, you are at a higher risk of becoming addicted to opioids. Only take this medicine after all other options have been tried. Take it for as short a time and as few doses as possible.     Do  not:    Drive. If you drive while taking these medicines, you could be arrested for driving under the influence (DUI).    Operate heavy machinery    Do any other dangerous activities while taking these medicines.     Drink any alcohol while taking these medicines.      Take with any other medicines that contain acetaminophen. Read all labels carefully. Look for the word  acetaminophen  or  Tylenol.  Ask your pharmacist if you have questions or are unsure.    Store your pills in a secure place, locked if possible. We will not replace any lost or stolen medicine. If you don t finish your medicine, please throw away (dispose) as directed by your pharmacist. The [place] Pollution Control Agency has more information about safe disposal: https://www.pca.[facility].mn.us/living-green/managing-unwanted-medications    All opioids tend to cause constipation. Drink plenty of water and eat foods that have a lot of fiber, such as fruits, vegetables, prune juice, apple juice and high-fiber cereal. Take a laxative (Miralax, milk of magnesia, Colace, Senna) if you don t move your bowels at least every other day.          Primary Care Provider Office Phone # Fax #    Rizwan PORTILLO [place] [phone]       [facility] [address] [place] [address]        Equal Access to Services     LLUVIA LEMUS AH: Hadii brendon ku hadasho Soomaali, waaxda luqadaha, qaybta kaalmada adeegyada, waxay katty barragan. So [facility] [phone].    ATENCIÓN: Si habla español, tiene a krause disposición servicios gratuitos de asistencia lingüística. Llame al [phone].    We comply with applicable federal civil rights laws and [place] laws. We do not discriminate on the basis of race, color, national origin, age, disability, sex, sexual orientation, or gender identity.            Thank you!     Thank you for choosing [facility]  for your care. Our goal is always to provide you with excellent  care. Hearing back from our patients is one way we can continue to improve our services. Please take a few minutes to complete the written survey that you may receive in the mail after your visit with us. Thank you!             Your Updated Medication List - Protect others around you: Learn how to safely use, store and throw away your medicines at www.disposemymeds.org.          This list is accurate as of 6/11/18  4:20 PM.  Always use your most recent med list.                   Brand Name Dispense Instructions for use Diagnosis    ALPRAZolam 1 MG tablet    XANAX     TK 1 T PO QD PRN        aspirin 325 MG tablet      Take 325 mg by mouth daily        baclofen 10 MG tablet    LIORESAL     TK ONE T PO BID PRF MUSCLE SPASM        HYDROcodone-acetaminophen 5-325 MG per tablet    NORCO    18 tablet    Take 1 tablet by mouth every 4 hours as needed for pain    Dysmenorrhea       MULTIVITAMIN ADULT PO           TRAMADOL HCL PO      Take 50 mg by mouth as needed        VITAMIN B12 PO           VITAMIN D-3 PO           ZOLOFT PO      Take 100 mg by mouth daily

## 2018-06-23 ENCOUNTER — HEALTH MAINTENANCE LETTER (OUTPATIENT)
Age: 43
End: 2018-06-23

## 2018-11-05 ENCOUNTER — OFFICE VISIT (OUTPATIENT)
Dept: OBGYN | Facility: CLINIC | Age: 43
End: 2018-11-05
Payer: COMMERCIAL

## 2018-11-05 VITALS
TEMPERATURE: 99.3 F | HEIGHT: 64 IN | BODY MASS INDEX: 26.63 KG/M2 | WEIGHT: 156 LBS | HEART RATE: 76 BPM | SYSTOLIC BLOOD PRESSURE: 122 MMHG | DIASTOLIC BLOOD PRESSURE: 88 MMHG

## 2018-11-05 DIAGNOSIS — D21.9 MYOMA: Primary | ICD-10-CM

## 2018-11-05 PROCEDURE — 99214 OFFICE O/P EST MOD 30 MIN: CPT | Performed by: OBSTETRICS & GYNECOLOGY

## 2018-11-05 NOTE — NURSING NOTE
Please abstract the following data from this visit with this patient into the appropriate field in Epic:    Pap smear done on this date: 9/23/16 (approximately), by this group: [facility], results were normal hpv negative-per care everywhere.

## 2018-11-05 NOTE — MR AVS SNAPSHOT
After Visit Summary   11/5/2018    Claire Khan    MRN: [id number]           Patient Information     Date Of Birth          1975        Visit Information        Provider Department      11/5/2018 4:00 PM Sj Samuel MD [facility]        Today's Diagnoses     Myoma    -  1       Follow-ups after your visit        Your next 10 appointments already scheduled     Feb 04, 2019  4:00 PM CST   SHORT with Sj Samuel MD   [facility] ([facility])    [address] [place] [id number] [phone]              Who to contact     If you have questions or need follow up information about today's clinic visit or your schedule please contact [facility][1] directly at [phone].  Normal or non-critical lab and imaging results will be communicated to you by Sayahhart, letter or phone within 4 business days after the clinic has received the results. If you do not hear from us within 7 days, please contact the clinic through Sayahhart or phone. If you have a critical or abnormal lab result, we will notify you by phone as soon as possible.  Submit refill requests through [employer] or call your pharmacy and they will forward the refill request to us. Please allow 3 business days for your refill to be completed.          Additional Information About Your Visit        MyChart Information     [employer] gives you secure access to your electronic health record. If you see a primary care provider, you can also send messages to your care team and make appointments. If you have questions, please call your primary care clinic.  If you do not have a primary care provider, please call [phone] and they will assist you.        Care EveryWhere ID     This is your Care EveryWhere ID. This could be used by other organizations to access your [place] medical records  [id number]        Your Vitals Were     Pulse  "Temperature Height Last Period BMI (Body Mass Index)       76 99.3  F (37.4  C) 5' 4\" (1.626 m) 09/28/2018 26.78 kg/m2        Blood Pressure from Last 3 Encounters:   11/05/18 122/88   06/11/18 136/80   05/07/18 118/76    Weight from Last 3 Encounters:   11/05/18 156 lb (70.8 kg)   06/11/18 153 lb (69.4 kg)   05/07/18 153 lb (69.4 kg)              Today, you had the following     No orders found for display       Primary Care Provider Office Phone # Fax #    Rizwan Lazo [phone]       [facility] [address] [place] [address]        Equal Access to Services     [facility]: Hadii brendon kingsley hadasho Soomaali, waaxda luqadaha, qaybta kaalmada adeegyada, waxtonny thompsonin haycmaelia guy . So [facility] [phone].    ATENCIÓN: Si habla español, tiene a krause disposición servicios gratuitos de asistencia lingüística. Brayan al [phone].    We comply with applicable federal civil rights laws and [place] laws. We do not discriminate on the basis of race, color, national origin, age, disability, sex, sexual orientation, or gender identity.            Thank you!     Thank you for choosing [facility]  for your care. Our goal is always to provide you with excellent care. Hearing back from our patients is one way we can continue to improve our services. Please take a few minutes to complete the written survey that you may receive in the mail after your visit with us. Thank you!             Your Updated Medication List - Protect others around you: Learn how to safely use, store and throw away your medicines at www.disposemymeds.org.          This list is accurate as of 11/5/18  4:19 PM.  Always use your most recent med list.                   Brand Name Dispense Instructions for use Diagnosis    ALPRAZolam 1 MG tablet    XANAX     TK 1 T PO QD PRN        aspirin 325 MG tablet      Take 325 mg by mouth daily        baclofen 10 MG tablet    LIORESAL     TK " ONE T PO BID PRF MUSCLE SPASM        HYDROcodone-acetaminophen 5-325 MG per tablet    NORCO    18 tablet    Take 1 tablet by mouth every 4 hours as needed for pain    Dysmenorrhea       medical cannabis (Patient's own supply.  Not a prescription)      Take  as instructed .        MULTIVITAMIN ADULT PO           TRAMADOL HCL PO      Take 50 mg by mouth as needed        VITAMIN B12 PO           VITAMIN D-3 PO           ZOLOFT PO      Take 100 mg by mouth daily

## 2018-11-05 NOTE — PROGRESS NOTES
SUBJECTIVE:                                                   Claire Khan is a 43 year old female who presents to clinic today for the following health issue(s):  Patient presents with:  Follow Up For: here to follow up on fibroids      HPI: The patient is seen at this time in follow-up of fibroids.  She had an ultrasound in June that showed a 2.8 cm anterior fibroid.  She also had 2 endometrial polyps and a resolving right ovarian cyst.  She had 2 menses in September.  Her last menstrual period was 9/28.  She does have occasional sharp stabbing central pelvic pain.      Patient's last menstrual period was 09/28/2018..   Patient is sexually active, No obstetric history on file..  Using condoms for contraception.    reports that she has never smoked. She has never used smokeless tobacco.    STD testing offered?  Declined    Health maintenance updated:  yes    Today's PHQ-2 Score:   PHQ-2 ( 1999 Pf[employer]) 4/25/2017   Q1: Little interest or pleasure in doing things 1   Q2: Feeling down, depressed or hopeless 0   PHQ-2 Score 1     Today's PHQ-9 Score: No flowsheet data found.  Today's PINA-7 Score: No flowsheet data found.    Problem list and histories reviewed & adjusted, as indicated.  Additional history: as documented.    Patient Active Problem List   Diagnosis   (none) - all problems resolved or deleted     Past Surgical History:   Procedure Laterality Date     ARTHROSCOPY KNEE WITH PATELLAR REALIGNMENT  6/29/2012    Procedure: ARTHROSCOPY KNEE WITH PATELLAR REALIGNMENT;  Right Knee Arthroscopy, Right Medial Patellar Femoral Ligament  Reconstruction with Graft, Partial Lateral Facetectomy, Lateral Retinacular Lengthening  ;  Surgeon: Rain Noe MD;  [facility]: [facility]     ARTHROSCOPY KNEE WITH RETINACULAR RELEASE  5/30/2014    Procedure: ARTHROSCOPY KNEE WITH RETINACULAR RELEASE MEDIAL OR LATERAL;  Surgeon: Rain Noe MD;  Location: [facility]     EXTERNAL EAR SURGERY       HYSTEROSCOPY, ABLATE  "ENDOMETRIUM VERSAPOINT , COMBINED N/A 4/6/2017    Procedure: COMBINED HYSTEROSCOPY, ABLATE ENDOMETRIUM VERSAPOINT;  Surgeon: Sj Samuel MD;  Location:  [facility]     PARTIAL HYMENECTOMY/REVISION HYMENAL RING       WISDOM TEETH[        Social History   Substance Use Topics     Smoking status: Never Smoker     Smokeless tobacco: Never Used     Alcohol use Yes      Comment: 1 DRINK PER MONTH      Problem (# of Occurrences) Relation (Name,Age of Onset)    C.A.D. (1) Mother    Diabetes (1) Father            Current Outpatient Prescriptions   Medication Sig     ALPRAZolam (XANAX) 1 MG tablet TK 1 T PO QD PRN     aspirin 325 MG tablet Take 325 mg by mouth daily     baclofen (LIORESAL) 10 MG tablet TK ONE T PO BID PRF MUSCLE SPASM     Cholecalciferol (VITAMIN D-3 PO)      Cyanocobalamin (VITAMIN B12 PO)      HYDROcodone-acetaminophen (NORCO) 5-325 MG per tablet Take 1 tablet by mouth every 4 hours as needed for pain     medical cannabis (Patient's own supply.  Not a prescription) Take  as instructed .     Multiple Vitamins-Minerals (MULTIVITAMIN ADULT PO)      Sertraline HCl (ZOLOFT PO) Take 100 mg by mouth daily      TRAMADOL HCL PO Take 50 mg by mouth as needed      No current facility-administered medications for this visit.      Allergies   Allergen Reactions     Bactrim      Blisters or  sores on tongue     Paxil [Paroxetine]      Panic attack     Sulfa Drugs Unknown     Mouth Sores     Vicodin [Hydrocodone-Acetaminophen]      anxiety       ROS:  12 point review of systems negative other than symptoms noted below.  Constitutional: Fatigue and Fever  Gastrointestinal: Bloating  Genitourinary: Cramps, Hot Flashes, Irregular Menses and Significant PMS  Neurologic: Dizziness  Endocrine: Decreased Libido  Psychiatric: Anxiety, Depression, Difficulty Sleeping and [place]    OBJECTIVE:     /88  Pulse 76  Ht 5' 4\" (1.626 m)  Wt 156 lb (70.8 kg)  LMP 09/28/2018  BMI 26.78 kg/m2  Body mass index is 26.78 " kg/(m^2).    Exam:  Constitutional:  Appearance: Well nourished, well developed alert, in no acute distress  Gastrointestinal:  Abdominal Examination:  Abdomen nontender to palpation, tone normal without rigidity or guarding, no masses present, umbilicus without lesions; Liver/Spleen:  No hepatomegaly present, liver nontender to palpation; Hernias:  No hernias present  Lymphatic: Lymph Nodes:  No other lymphadenopathy present  Skin:General Inspection:  No rashes present, no lesions present, no areas of discoloration; Genitalia and Groin:  No rashes present, no lesions present, no areas of discoloration, no masses present.  Neurologic/Psychiatric:  Mental Status:  Oriented X3   Pelvic Exam:  External Genitalia:     Normal appearance for age, no discharge present, no tenderness present, no inflammatory lesions present, color normal  Vagina:     Normal vaginal vault without central or paravaginal defects, no discharge present, no inflammatory lesions present, no masses present  Bladder:     Nontender to palpation  Urethra:   Urethral Body:  Urethra palpation normal, urethra structural support normal   Urethral Meatus:  No erythema or lesions present  Cervix:     Appearance healthy, no lesions present, nontender to palpation, no bleeding present  Uterus: Enlarged multi-bosselated and tender uterus.  Approximately 14-week size   .   Adnexa:     No adnexal tenderness present, no adnexal masses present  Perineum:     Perineum within normal limits, no evidence of trauma, no rashes or skin lesions present  Anus:     Anus within normal limits, no hemorrhoids present  Inguinal Lymph Nodes:     No lymphadenopathy present  Pubic Hair:     Normal pubic hair distribution for age  Genitalia and Groin:     No rashes present, no lesions present, no areas of discoloration, no masses present       In-Clinic Test Results:      ASSESSMENT/PLAN:                                                        Patient with recurrent uterine  fibroids.  She has had a previous myomectomy.  She has tenderness and pain and irregular cycles at this time.  She may also have adenomyosis.  We have given her the option of further observation versus surgical intervention with a laparoscopic-assisted supracervical hysterectomy.  At her age we would retain ovarian function.  The risks and complications were reviewed at length.  The patient is very reticent to move ahead surgically due to fear of anesthesia.        Sj Samuel MD  [facility] [place]

## 2018-11-12 ENCOUNTER — TELEPHONE (OUTPATIENT)
Dept: OBGYN | Facility: CLINIC | Age: 43
End: 2018-11-12

## 2018-11-12 DIAGNOSIS — D21.9 MYOMA: Primary | ICD-10-CM

## 2018-11-12 NOTE — TELEPHONE ENCOUNTER
11/5/18 Myoma.  Discussed hysterectomy.  Pt has follow-up appt in February. Pt wondering if Dr. Samuel could do hyst this year. Routing to Dr. Samuel. Please advise.       11/5/18 Patient with recurrent uterine fibroids.  She has had a previous myomectomy.  She has tenderness and pain and irregular cycles at this time.  She may also have adenomyosis.  We have given her the option of further observation versus surgical intervention with a laparoscopic-assisted supracervical hysterectomy.  At her age we would retain ovarian function.  The risks and complications were reviewed at length.  The patient is very reticent to move ahead surgically due to fear of anesthesia.

## 2018-11-14 ENCOUNTER — TELEPHONE (OUTPATIENT)
Dept: OBGYN | Facility: CLINIC | Age: 43
End: 2018-11-14

## 2018-11-14 NOTE — LETTER
December 5, 2018    Claire Khan                                                                                                                     [address] [facility] [address]      Dear Claire,    We have made effort to obtain an accurate quote from your insurance company based on the information we have on file. Your actual coverage may differ. [facility] can NOT guarantee coverage. We recommend that if you have questions regarding coverage to call your insurance company. Our billing department is happy to assist you with your insurance claim but you are responsible for all services rendered whether or not they are paid by your insurance provider. The below information was obtained from your insurance company but again, we do not guarantee coverage.       Insurance Co [employer] Phone # [phone]  ID [id number]         Group [id number]  Jaky georges w/ Anthony on 12/4/2018    Policy Eff Date 1/1/2017 and current Yes  CoInsurance (covered at) 90% after deductible has been met and 100% after MOOP is met  Deductible $500 met to date $500  Max Out Of Pocket (MOOP) $3500 met to date $1002.59  CoPay $NA  Prior Auth Required:  No  Pre Existing Condition No  Surgeon [place] In Network Yes  Hospital [facility] In Network Yes  Referral Needed:  No.  Mailed to Patient Yes                  If No Document why by date                  If Yes                                   Date 12/5/2018      Sincerely,         Jaky Allison  Surgery Scheduler

## 2018-11-14 NOTE — TELEPHONE ENCOUNTER
Type of surgery: LASH BS  Location of surgery: [facility] OR  Date and time of surgery: 12/11/2018 8am ARRIVAL 6am  Surgeon: Lizzie  Pre-Op Appt Date: 12/3/2018  Post-Op Appt Date: TBD   Packet sent out: MAILED 11/14/2018  Pre-cert/Authorization completed:  TBD  Date: 11/14/2018 Suellen torres/Darcie Allison  Surgery Scheduler

## 2018-11-14 NOTE — TELEPHONE ENCOUNTER
Order Questions      Question Answer Comment     Procedure name(s) - multi select Laparoscopic assisted supracervical hysterectomy bilateral salpingectomy      Is this a multi surgeon case? No      Laterality Bilateral      Reason for procedure Myomas      Location of Case: [facility] OR      Surgeon Procedure Time (incision to closure) in minutes (per procedure as applicable) 75      Note:  Surgical Case Time Needed (in minutes)     Patient Class (for admit prior to surgery, specify number of days in comments): Same day (hospital outpatient)      Why can t this outpatient surgery be done at the [facility] or [facility]? -      Anesthesia General      Vendor Needed? Yes

## 2018-12-03 ENCOUNTER — OFFICE VISIT (OUTPATIENT)
Dept: OBGYN | Facility: CLINIC | Age: 43
End: 2018-12-03
Payer: COMMERCIAL

## 2018-12-03 VITALS
HEIGHT: 64 IN | BODY MASS INDEX: 26.12 KG/M2 | WEIGHT: 153 LBS | SYSTOLIC BLOOD PRESSURE: 148 MMHG | HEART RATE: 72 BPM | DIASTOLIC BLOOD PRESSURE: 84 MMHG

## 2018-12-03 DIAGNOSIS — D21.9 MYOMA: ICD-10-CM

## 2018-12-03 DIAGNOSIS — D21.9 MYOMA: Primary | ICD-10-CM

## 2018-12-03 DIAGNOSIS — Z01.812 PRE-OPERATIVE LABORATORY EXAMINATION: Primary | ICD-10-CM

## 2018-12-03 DIAGNOSIS — Z48.816 AFTERCARE FOLLOWING SURGERY OF THE GENITOURINARY SYSTEM: ICD-10-CM

## 2018-12-03 LAB — HGB BLD-MCNC: 15.4 G/DL (ref 11.7–15.7)

## 2018-12-03 PROCEDURE — 85018 HEMOGLOBIN: CPT | Performed by: OBSTETRICS & GYNECOLOGY

## 2018-12-03 PROCEDURE — 36415 COLL VENOUS BLD VENIPUNCTURE: CPT | Performed by: OBSTETRICS & GYNECOLOGY

## 2018-12-03 PROCEDURE — 99214 OFFICE O/P EST MOD 30 MIN: CPT | Performed by: OBSTETRICS & GYNECOLOGY

## 2018-12-03 NOTE — Clinical Note
Please abstract the following data from this visit with this patient into the appropriate field in Epic:  Pap smear done on this date: 9/23/16 (approximately), by this group: [facility], results were normal and HPV negative per care everywhere..

## 2018-12-03 NOTE — PROGRESS NOTES
SUBJECTIVE:                                                   Claire Khan is a 43 year old female who presents to clinic today for the following health issue(s):  Patient presents with:  Pre-Op Exam: schduled for Laparoscopic assisted supracervical hysterectomy bilateral salpingectomy on 12/11/18.      HPI: The patient is a 43-year-old who is seen at this time for preoperative clearance for a supracervical hysterectomy and bilateral salpingectomy.  She has abnormal bleeding with intrauterine polyps and fibroids.  The option of minor surgery versus major been presented.  The patient is very nervous about general anesthesia.  We went over all of the risks and complications.  Her general health is excellent.  She is a non-smoker.      Patient's last menstrual period was 11/29/2018..   Patient is sexually active, No obstetric history on file..  Using condoms for contraception.    reports that she has never smoked. She has never used smokeless tobacco.    STD testing offered?  Declined    Health maintenance updated:  yes    Today's PHQ-2 Score:   PHQ-2 ( 1999 Pfizer) 12/3/2018   Q1: Little interest or pleasure in doing things 1   Q2: Feeling down, depressed or hopeless 1   PHQ-2 Score 2     Today's PHQ-9 Score: No flowsheet data found.  Today's PINA-7 Score: No flowsheet data found.    Problem list and histories reviewed & adjusted, as indicated.  Additional history: as documented.    Patient Active Problem List   Diagnosis   (none) - all problems resolved or deleted     Past Surgical History:   Procedure Laterality Date     ARTHROSCOPY KNEE WITH PATELLAR REALIGNMENT  6/29/2012    Procedure: ARTHROSCOPY KNEE WITH PATELLAR REALIGNMENT;  Right Knee Arthroscopy, Right Medial Patellar Femoral Ligament  Reconstruction with Graft, Partial Lateral Facetectomy, Lateral Retinacular Lengthening  ;  Surgeon: Rain Noe MD;  [facility]     ARTHROSCOPY KNEE WITH RETINACULAR RELEASE  5/30/2014    Procedure:  ARTHROSCOPY KNEE WITH RETINACULAR RELEASE MEDIAL OR LATERAL;  Surgeon: Rain Noe MD;  [facility]     EXTERNAL EAR SURGERY       HYSTEROSCOPY, ABLATE ENDOMETRIUM VERSAPOINT , COMBINED N/A 4/6/2017    Procedure: COMBINED HYSTEROSCOPY, ABLATE ENDOMETRIUM VERSAPOINT;  Surgeon: Sj Samuel MD;  Location: [facility]     PARTIAL HYMENECTOMY/REVISION HYMENAL RING       WISDOM TEETH[        Social History   Substance Use Topics     Smoking status: Never Smoker     Smokeless tobacco: Never Used     Alcohol use Yes      Comment: 1 DRINK PER MONTH      Problem (# of Occurrences) Relation (Name,Age of Onset)    C.A.D. (1) Mother    Diabetes (1) Father            Current Outpatient Prescriptions   Medication Sig     ALPRAZolam (XANAX) 1 MG tablet TK 1 T PO QD PRN     aspirin 325 MG tablet Take 325 mg by mouth daily     baclofen (LIORESAL) 10 MG tablet TK ONE T PO BID PRF MUSCLE SPASM     Cholecalciferol (VITAMIN D-3 PO)      Cyanocobalamin (VITAMIN B12 PO)      HYDROcodone-acetaminophen (NORCO) 5-325 MG per tablet Take 1 tablet by mouth every 4 hours as needed for pain     medical cannabis (Patient's own supply.  Not a prescription) Take  as instructed .     Multiple Vitamins-Minerals (MULTIVITAMIN ADULT PO)      Sertraline HCl (ZOLOFT PO) Take 100 mg by mouth daily      TRAMADOL HCL PO Take 50 mg by mouth as needed      No current facility-administered medications for this visit.      Allergies   Allergen Reactions     Bactrim      Blisters or  sores on tongue     Paxil [Paroxetine]      Panic attack     Sulfa Drugs Unknown     Mouth Sores     Vicodin [Hydrocodone-Acetaminophen]      anxiety       ROS:  12 point review of systems negative other than symptoms noted below.  Head: Ringing  Cardiovascular: Palpitations  Gastrointestinal: Abdominal Pain  Genitourinary: Cramps, Irregular Menses, Night Sweats and Significant PMS  Musculoskeletal: Joint Pain  Endocrine: Decreased Libido  Psychiatric: Anxiety,  "Depression, Difficulty Sleeping and Martinez    OBJECTIVE:     /84  Pulse 72  Ht 5' 4\" (1.626 m)  Wt 153 lb (69.4 kg)  LMP 11/29/2018  BMI 26.26 kg/m2  Body mass index is 26.26 kg/(m^2).    Exam:  Constitutional:  Appearance: Well nourished, well developed alert, in no acute distress  Neck:  Lymph Nodes:  No lymphadenopathy present; Thyroid:  Gland size normal, nontender, no nodules or masses present on palpation  Chest:  Respiratory Effort:  Breathing unlabored  Cardiovascular: Heart: Auscultation:  Regular rate, normal rhythm, no murmurs present  Gastrointestinal:  Abdominal Examination:  Abdomen nontender to palpation, tone normal without rigidity or guarding, no masses present, umbilicus without lesions; Liver/Spleen:  No hepatomegaly present, liver nontender to palpation; Hernias:  No hernias present  Lymphatic: Lymph Nodes:  No other lymphadenopathy present  Skin:General Inspection:  No rashes present, no lesions present, no areas of discoloration; Genitalia and Groin:  No rashes present, no lesions present, no areas of discoloration, no masses present.  Neurologic/Psychiatric:  Mental Status:  Oriented X3   No Pelvic Exam performed     In-Clinic Test Results:  Results for orders placed or performed in visit on 12/03/18 (from the past 24 hour(s))   Hemoglobin   Result Value Ref Range    Hemoglobin 15.4 11.7 - 15.7 g/dL       ASSESSMENT/PLAN:                                                      Patient with abnormal bleeding with known fibroids and polyps.  She is admitted at this time for laparoscopic evaluation for supracervical hysterectomy with bilateral salpingectomy.  The risks and complications have been reviewed.  This includes general anesthesia blood loss infection injury bowel bladder and ureters necessitating further surgery.  She understands that we will use morcellation for the procedure and the risk of sarcoma is less than 1000.  She accepts all of these risks.          Sj Samuel, " MD  [facility]

## 2018-12-03 NOTE — MR AVS SNAPSHOT
After Visit Summary   12/3/2018    Claire Khan    MRN: [id number]           Patient Information     Date Of Birth          1975        Visit Information        Provider Department      12/3/2018 2:30 PM Sj Samuel MD [facility]        Today's Diagnoses     Myoma    -  1       Follow-ups after your visit        Your next 10 appointments already scheduled     Dec 11, 2018   Procedure with Sj Samuel MD   [facility] (--)    [address]  [facility] [address]   [phone]            Feb 04, 2019  4:00 PM CST   SHORT with Sj Samuel MD   [facility] ([facility])    [address]  [address]  [facility] [place] [id number] [phone]              Future tests that were ordered for you today     Open Future Orders        Priority Expected Expires Ordered    Hemoglobin Routine  11/26/2019 11/26/2018            Who to contact     If you have questions or need follow up information about today's clinic visit or your schedule please contact [facility] [place] directly at [phone].  Normal or non-critical lab and imaging results will be communicated to you by [employer] Coach[employer], letter or phone within 4 business days after the clinic has received the results. If you do not hear from us within 7 days, please contact the clinic through [employer] or phone. If you have a critical or abnormal lab result, we will notify you by phone as soon as possible.  Submit refill requests through [employer] or call your pharmacy and they will forward the refill request to us. Please allow 3 business days for your refill to be completed.          Additional Information About Your Visit        MyChart Information     [employer] gives you secure access to your electronic health record. If you see a primary care provider, you can also send messages to your care team and make appointments. If you have  "questions, please call your primary care clinic.  If you do not have a primary care provider, please call [phone] and they will assist you.        Care EveryWhere ID     This is your Care EveryWhere ID. This could be used by other organizations to access your [place] medical records  [id number]        Your Vitals Were     Pulse Height Last Period BMI (Body Mass Index)          72 5' 4\" (1.626 m) 11/29/2018 26.26 kg/m2         Blood Pressure from Last 3 Encounters:   12/03/18 148/84   11/05/18 122/88   06/11/18 136/80    Weight from Last 3 Encounters:   12/03/18 153 lb (69.4 kg)   11/05/18 156 lb (70.8 kg)   06/11/18 153 lb (69.4 kg)              Today, you had the following     No orders found for display       Primary Care Provider Office Phone # Fax #    Rizwan Lazo [phone]       [facility] [address] [place] [address]        Equal Access to Services     [facility]: Hadii aad ku hadasho Soomaali, waaxda luqadaha, qaybta kaalmada adeegyada, stuart guy . So [facility] [phone].    ATENCIÓN: Si habla español, tiene a krause disposición servicios gratuitos de asistencia lingüística. Brayan al [phone].    We comply with applicable federal civil rights laws and [place] laws. We do not discriminate on the basis of race, color, national origin, age, disability, sex, sexual orientation, or gender identity.            Thank you!     Thank you for choosing [facility]  for your care. Our goal is always to provide you with excellent care. Hearing back from our patients is one way we can continue to improve our services. Please take a few minutes to complete the written survey that you may receive in the mail after your visit with us. Thank you!             Your Updated Medication List - Protect others around you: Learn how to safely use, store and throw away your medicines at www.disposemymeds.org.          This list " is accurate as of 12/3/18  3:04 PM.  Always use your most recent med list.                   Brand Name Dispense Instructions for use Diagnosis    ALPRAZolam 1 MG tablet    XANAX     TK 1 T PO QD PRN        aspirin 325 MG tablet    ASA     Take 325 mg by mouth daily        baclofen 10 MG tablet    LIORESAL     TK ONE T PO BID PRF MUSCLE SPASM        HYDROcodone-acetaminophen 5-325 MG tablet    NORCO    18 tablet    Take 1 tablet by mouth every 4 hours as needed for pain    Dysmenorrhea       medical cannabis (Patient's own supply.  Not a prescription)      Take  as instructed .        MULTIVITAMIN ADULT PO           TRAMADOL HCL PO      Take 50 mg by mouth as needed        VITAMIN B12 PO           VITAMIN D-3 PO           ZOLOFT PO      Take 100 mg by mouth daily

## 2018-12-05 NOTE — TELEPHONE ENCOUNTER
[employer] Phone # [phone]  ID [id number]         Group [id number]  Jaky josé manuel w/ Anthony on 12/4/2018    Policy Eff Date 1/1/2017 and current Yes  CoInsurance (covered at) 90% after deductible has been met and 100% after MOOP is met  Deductible $500 met to date $500  Max Out Of Pocket (MOOP) $3500 met to date $1002.59  CoPay $NA  Prior Auth Required:  No  Pre Existing Condition No  Surgeon Lizzie In Network Yes  Hospital [facility] In Network Yes  Referral Needed:  No.  Mailed to Patient Yes                  If No Document why by date                  If Yes                                   Date 12/5/2018      Jaky Allison  Surgery Scheduler

## 2018-12-06 NOTE — H&P (VIEW-ONLY)
SUBJECTIVE:                                                   Claire Khan is a 43 year old female who presents to clinic today for the following health issue(s):  Patient presents with:  Pre-Op Exam: schduled for Laparoscopic assisted supracervical hysterectomy bilateral salpingectomy on 12/11/18.      HPI: The patient is a 43-year-old who is seen at this time for preoperative clearance for a supracervical hysterectomy and bilateral salpingectomy.  She has abnormal bleeding with intrauterine polyps and fibroids.  The option of minor surgery versus major been presented.  The patient is very nervous about general anesthesia.  We went over all of the risks and complications.  Her general health is excellent.  She is a non-smoker.      Patient's last menstrual period was 11/29/2018..   Patient is sexually active, No obstetric history on file..  Using condoms for contraception.    reports that she has never smoked. She has never used smokeless tobacco.    STD testing offered?  Declined    Health maintenance updated:  yes    Today's PHQ-2 Score:   PHQ-2 ( 1999 Pfizer) 12/3/2018   Q1: Little interest or pleasure in doing things 1   Q2: Feeling down, depressed or hopeless 1   PHQ-2 Score 2     Today's PHQ-9 Score: No flowsheet data found.  Today's PINA-7 Score: No flowsheet data found.    Problem list and histories reviewed & adjusted, as indicated.  Additional history: as documented.    Patient Active Problem List   Diagnosis   (none) - all problems resolved or deleted     Past Surgical History:   Procedure Laterality Date     ARTHROSCOPY KNEE WITH PATELLAR REALIGNMENT  6/29/2012    Procedure: ARTHROSCOPY KNEE WITH PATELLAR REALIGNMENT;  Right Knee Arthroscopy, Right Medial Patellar Femoral Ligament  Reconstruction with Graft, Partial Lateral Facetectomy, Lateral Retinacular Lengthening  ;  Surgeon: Rain Noe MD;  [facility]     ARTHROSCOPY KNEE WITH RETINACULAR RELEASE  5/30/2014    Procedure:  ARTHROSCOPY KNEE WITH RETINACULAR RELEASE MEDIAL OR LATERAL;  Surgeon: Rain Noe MD;  [facility]     EXTERNAL EAR SURGERY       HYSTEROSCOPY, ABLATE ENDOMETRIUM VERSAPOINT , COMBINED N/A 4/6/2017    Procedure: COMBINED HYSTEROSCOPY, ABLATE ENDOMETRIUM VERSAPOINT;  Surgeon: Sj Samuel MD;  Location: [facility]     PARTIAL HYMENECTOMY/REVISION HYMENAL RING       WISDOM TEETH[        Social History   Substance Use Topics     Smoking status: Never Smoker     Smokeless tobacco: Never Used     Alcohol use Yes      Comment: 1 DRINK PER MONTH      Problem (# of Occurrences) Relation (Name,Age of Onset)    C.A.D. (1) Mother    Diabetes (1) Father            Current Outpatient Prescriptions   Medication Sig     ALPRAZolam (XANAX) 1 MG tablet TK 1 T PO QD PRN     aspirin 325 MG tablet Take 325 mg by mouth daily     baclofen (LIORESAL) 10 MG tablet TK ONE T PO BID PRF MUSCLE SPASM     Cholecalciferol (VITAMIN D-3 PO)      Cyanocobalamin (VITAMIN B12 PO)      HYDROcodone-acetaminophen (NORCO) 5-325 MG per tablet Take 1 tablet by mouth every 4 hours as needed for pain     medical cannabis (Patient's own supply.  Not a prescription) Take  as instructed .     Multiple Vitamins-Minerals (MULTIVITAMIN ADULT PO)      Sertraline HCl (ZOLOFT PO) Take 100 mg by mouth daily      TRAMADOL HCL PO Take 50 mg by mouth as needed      No current facility-administered medications for this visit.      Allergies   Allergen Reactions     Bactrim      Blisters or  sores on tongue     Paxil [Paroxetine]      Panic attack     Sulfa Drugs Unknown     Mouth Sores     Vicodin [Hydrocodone-Acetaminophen]      anxiety       ROS:  12 point review of systems negative other than symptoms noted below.  Head: Ringing  Cardiovascular: Palpitations  Gastrointestinal: Abdominal Pain  Genitourinary: Cramps, Irregular Menses, Night Sweats and Significant PMS  Musculoskeletal: Joint Pain  Endocrine: Decreased Libido  Psychiatric: Anxiety,  "Depression, Difficulty Sleeping and Martinez    OBJECTIVE:     /84  Pulse 72  Ht 5' 4\" (1.626 m)  Wt 153 lb (69.4 kg)  LMP 11/29/2018  BMI 26.26 kg/m2  Body mass index is 26.26 kg/(m^2).    Exam:  Constitutional:  Appearance: Well nourished, well developed alert, in no acute distress  Neck:  Lymph Nodes:  No lymphadenopathy present; Thyroid:  Gland size normal, nontender, no nodules or masses present on palpation  Chest:  Respiratory Effort:  Breathing unlabored  Cardiovascular: Heart: Auscultation:  Regular rate, normal rhythm, no murmurs present  Gastrointestinal:  Abdominal Examination:  Abdomen nontender to palpation, tone normal without rigidity or guarding, no masses present, umbilicus without lesions; Liver/Spleen:  No hepatomegaly present, liver nontender to palpation; Hernias:  No hernias present  Lymphatic: Lymph Nodes:  No other lymphadenopathy present  Skin:General Inspection:  No rashes present, no lesions present, no areas of discoloration; Genitalia and Groin:  No rashes present, no lesions present, no areas of discoloration, no masses present.  Neurologic/Psychiatric:  Mental Status:  Oriented X3   No Pelvic Exam performed     In-Clinic Test Results:  Results for orders placed or performed in visit on 12/03/18 (from the past 24 hour(s))   Hemoglobin   Result Value Ref Range    Hemoglobin 15.4 11.7 - 15.7 g/dL       ASSESSMENT/PLAN:                                                      Patient with abnormal bleeding with known fibroids and polyps.  She is admitted at this time for laparoscopic evaluation for supracervical hysterectomy with bilateral salpingectomy.  The risks and complications have been reviewed.  This includes general anesthesia blood loss infection injury bowel bladder and ureters necessitating further surgery.  She understands that we will use morcellation for the procedure and the risk of sarcoma is less than 1000.  She accepts all of these risks.          Sj Samuel, " MD  [facility]

## 2018-12-10 RX ORDER — HYDROCORTISONE ACETATE 25 MG/1
25 SUPPOSITORY RECTAL 2 TIMES DAILY PRN
Status: ON HOLD | COMMUNITY
End: 2018-12-11

## 2018-12-10 RX ORDER — TURMERIC 100 %
0.5 POWDER (GRAM) MISCELLANEOUS DAILY
Status: ON HOLD | COMMUNITY
End: 2018-12-11

## 2018-12-11 ENCOUNTER — HOSPITAL ENCOUNTER (OUTPATIENT)
Facility: CLINIC | Age: 43
Discharge: HOME | End: 2018-12-12
Attending: OBSTETRICS & GYNECOLOGY | Admitting: OBSTETRICS & GYNECOLOGY
Payer: COMMERCIAL

## 2018-12-11 ENCOUNTER — ANESTHESIA EVENT (OUTPATIENT)
Dept: SURGERY | Facility: CLINIC | Age: 43
End: 2018-12-11
Payer: COMMERCIAL

## 2018-12-11 ENCOUNTER — SURGERY (OUTPATIENT)
Age: 43
End: 2018-12-11
Payer: COMMERCIAL

## 2018-12-11 ENCOUNTER — ANESTHESIA (OUTPATIENT)
Dept: SURGERY | Facility: CLINIC | Age: 43
End: 2018-12-11
Payer: COMMERCIAL

## 2018-12-11 DIAGNOSIS — D21.9 MYOMA: Primary | ICD-10-CM

## 2018-12-11 LAB — B-HCG SERPL-ACNC: <1 IU/L (ref 0–5)

## 2018-12-11 PROCEDURE — 25000128 H RX IP 250 OP 636: Performed by: ANESTHESIOLOGY

## 2018-12-11 PROCEDURE — 25000128 H RX IP 250 OP 636: Performed by: NURSE ANESTHETIST, CERTIFIED REGISTERED

## 2018-12-11 PROCEDURE — 88307 TISSUE EXAM BY PATHOLOGIST: CPT | Mod: 26 | Performed by: OBSTETRICS & GYNECOLOGY

## 2018-12-11 PROCEDURE — 37000009 ZZH ANESTHESIA TECHNICAL FEE, EACH ADDTL 15 MIN: Performed by: OBSTETRICS & GYNECOLOGY

## 2018-12-11 PROCEDURE — 25000128 H RX IP 250 OP 636: Performed by: OBSTETRICS & GYNECOLOGY

## 2018-12-11 PROCEDURE — 27210794 ZZH OR GENERAL SUPPLY STERILE: Performed by: OBSTETRICS & GYNECOLOGY

## 2018-12-11 PROCEDURE — 36000093 ZZH SURGERY LEVEL 4 1ST 30 MIN: Performed by: OBSTETRICS & GYNECOLOGY

## 2018-12-11 PROCEDURE — 71000013 ZZH RECOVERY PHASE 1 LEVEL 1 EA ADDTL HR: Performed by: OBSTETRICS & GYNECOLOGY

## 2018-12-11 PROCEDURE — 71000012 ZZH RECOVERY PHASE 1 LEVEL 1 FIRST HR: Performed by: OBSTETRICS & GYNECOLOGY

## 2018-12-11 PROCEDURE — 40000170 ZZH STATISTIC PRE-PROCEDURE ASSESSMENT II: Performed by: OBSTETRICS & GYNECOLOGY

## 2018-12-11 PROCEDURE — 25000125 ZZHC RX 250: Performed by: ANESTHESIOLOGY

## 2018-12-11 PROCEDURE — 25000566 ZZH SEVOFLURANE, EA 15 MIN: Performed by: OBSTETRICS & GYNECOLOGY

## 2018-12-11 PROCEDURE — 88307 TISSUE EXAM BY PATHOLOGIST: CPT | Performed by: OBSTETRICS & GYNECOLOGY

## 2018-12-11 PROCEDURE — 25800025 ZZH RX 258: Performed by: OBSTETRICS & GYNECOLOGY

## 2018-12-11 PROCEDURE — 88342 IMHCHEM/IMCYTCHM 1ST ANTB: CPT | Mod: 26 | Performed by: OBSTETRICS & GYNECOLOGY

## 2018-12-11 PROCEDURE — 25000125 ZZHC RX 250: Performed by: NURSE ANESTHETIST, CERTIFIED REGISTERED

## 2018-12-11 PROCEDURE — 40000936 ZZH STATISTIC OUTPATIENT (NON-OBS) NIGHT

## 2018-12-11 PROCEDURE — 88341 IMHCHEM/IMCYTCHM EA ADD ANTB: CPT | Performed by: OBSTETRICS & GYNECOLOGY

## 2018-12-11 PROCEDURE — 37000008 ZZH ANESTHESIA TECHNICAL FEE, 1ST 30 MIN: Performed by: OBSTETRICS & GYNECOLOGY

## 2018-12-11 PROCEDURE — 36000063 ZZH SURGERY LEVEL 4 EA 15 ADDTL MIN: Performed by: OBSTETRICS & GYNECOLOGY

## 2018-12-11 PROCEDURE — 88342 IMHCHEM/IMCYTCHM 1ST ANTB: CPT | Performed by: OBSTETRICS & GYNECOLOGY

## 2018-12-11 PROCEDURE — 84702 CHORIONIC GONADOTROPIN TEST: CPT | Performed by: OBSTETRICS & GYNECOLOGY

## 2018-12-11 PROCEDURE — 25000132 ZZH RX MED GY IP 250 OP 250 PS 637: Performed by: OBSTETRICS & GYNECOLOGY

## 2018-12-11 PROCEDURE — 40000935 ZZH STATISTIC OUTPATIENT (NON-OBS) EVE

## 2018-12-11 PROCEDURE — 36415 COLL VENOUS BLD VENIPUNCTURE: CPT | Performed by: OBSTETRICS & GYNECOLOGY

## 2018-12-11 PROCEDURE — 40000934 ZZH STATISTIC OUTPATIENT (NON-OBS) DAY

## 2018-12-11 PROCEDURE — 88341 IMHCHEM/IMCYTCHM EA ADD ANTB: CPT | Mod: 26 | Performed by: OBSTETRICS & GYNECOLOGY

## 2018-12-11 PROCEDURE — 58542 LSH W/T/O UT 250 G OR LESS: CPT | Performed by: OBSTETRICS & GYNECOLOGY

## 2018-12-11 RX ORDER — FENTANYL CITRATE 50 UG/ML
25-50 INJECTION, SOLUTION INTRAMUSCULAR; INTRAVENOUS
Status: DISCONTINUED | OUTPATIENT
Start: 2018-12-11 | End: 2018-12-11 | Stop reason: HOSPADM

## 2018-12-11 RX ORDER — NALOXONE HYDROCHLORIDE 0.4 MG/ML
.1-.4 INJECTION, SOLUTION INTRAMUSCULAR; INTRAVENOUS; SUBCUTANEOUS
Status: DISCONTINUED | OUTPATIENT
Start: 2018-12-11 | End: 2018-12-11 | Stop reason: HOSPADM

## 2018-12-11 RX ORDER — OXYCODONE AND ACETAMINOPHEN 5; 325 MG/1; MG/1
1-2 TABLET ORAL EVERY 4 HOURS PRN
Status: DISCONTINUED | OUTPATIENT
Start: 2018-12-11 | End: 2018-12-12 | Stop reason: HOSPADM

## 2018-12-11 RX ORDER — PROPOFOL 10 MG/ML
INJECTION, EMULSION INTRAVENOUS PRN
Status: DISCONTINUED | OUTPATIENT
Start: 2018-12-11 | End: 2018-12-11

## 2018-12-11 RX ORDER — ONDANSETRON 4 MG/1
4 TABLET, ORALLY DISINTEGRATING ORAL EVERY 30 MIN PRN
Status: DISCONTINUED | OUTPATIENT
Start: 2018-12-11 | End: 2018-12-11 | Stop reason: HOSPADM

## 2018-12-11 RX ORDER — HEPARIN SODIUM 1000 [USP'U]/ML
INJECTION, SOLUTION INTRAVENOUS; SUBCUTANEOUS
Status: DISCONTINUED
Start: 2018-12-11 | End: 2018-12-11 | Stop reason: HOSPADM

## 2018-12-11 RX ORDER — DEXAMETHASONE SODIUM PHOSPHATE 4 MG/ML
INJECTION, SOLUTION INTRA-ARTICULAR; INTRALESIONAL; INTRAMUSCULAR; INTRAVENOUS; SOFT TISSUE PRN
Status: DISCONTINUED | OUTPATIENT
Start: 2018-12-11 | End: 2018-12-11

## 2018-12-11 RX ORDER — PROPOFOL 10 MG/ML
INJECTION, EMULSION INTRAVENOUS CONTINUOUS PRN
Status: DISCONTINUED | OUTPATIENT
Start: 2018-12-11 | End: 2018-12-11

## 2018-12-11 RX ORDER — SODIUM CHLORIDE, SODIUM LACTATE, POTASSIUM CHLORIDE, CALCIUM CHLORIDE 600; 310; 30; 20 MG/100ML; MG/100ML; MG/100ML; MG/100ML
INJECTION, SOLUTION INTRAVENOUS CONTINUOUS PRN
Status: DISCONTINUED | OUTPATIENT
Start: 2018-12-11 | End: 2018-12-11

## 2018-12-11 RX ORDER — OXYCODONE AND ACETAMINOPHEN 5; 325 MG/1; MG/1
1-2 TABLET ORAL EVERY 4 HOURS PRN
Qty: 30 TABLET | Refills: 0 | Status: SHIPPED | OUTPATIENT
Start: 2018-12-11 | End: 2019-03-21

## 2018-12-11 RX ORDER — SCOLOPAMINE TRANSDERMAL SYSTEM 1 MG/1
1 PATCH, EXTENDED RELEASE TRANSDERMAL
Status: DISCONTINUED | OUTPATIENT
Start: 2018-12-11 | End: 2018-12-11 | Stop reason: HOSPADM

## 2018-12-11 RX ORDER — LIDOCAINE 40 MG/G
CREAM TOPICAL
Status: DISCONTINUED | OUTPATIENT
Start: 2018-12-11 | End: 2018-12-12 | Stop reason: HOSPADM

## 2018-12-11 RX ORDER — CEFAZOLIN SODIUM 2 G/100ML
2 INJECTION, SOLUTION INTRAVENOUS
Status: COMPLETED | OUTPATIENT
Start: 2018-12-11 | End: 2018-12-11

## 2018-12-11 RX ORDER — SODIUM CHLORIDE, SODIUM LACTATE, POTASSIUM CHLORIDE, CALCIUM CHLORIDE 600; 310; 30; 20 MG/100ML; MG/100ML; MG/100ML; MG/100ML
INJECTION, SOLUTION INTRAVENOUS CONTINUOUS
Status: DISCONTINUED | OUTPATIENT
Start: 2018-12-11 | End: 2018-12-11 | Stop reason: HOSPADM

## 2018-12-11 RX ORDER — FENTANYL CITRATE 50 UG/ML
INJECTION, SOLUTION INTRAMUSCULAR; INTRAVENOUS PRN
Status: DISCONTINUED | OUTPATIENT
Start: 2018-12-11 | End: 2018-12-11

## 2018-12-11 RX ORDER — CEFAZOLIN SODIUM 2 G/100ML
2 INJECTION, SOLUTION INTRAVENOUS EVERY 8 HOURS
Status: COMPLETED | OUTPATIENT
Start: 2018-12-11 | End: 2018-12-12

## 2018-12-11 RX ORDER — HYDROMORPHONE HYDROCHLORIDE 1 MG/ML
0.2 INJECTION, SOLUTION INTRAMUSCULAR; INTRAVENOUS; SUBCUTANEOUS
Status: DISCONTINUED | OUTPATIENT
Start: 2018-12-11 | End: 2018-12-12 | Stop reason: HOSPADM

## 2018-12-11 RX ORDER — NEOSTIGMINE METHYLSULFATE 1 MG/ML
VIAL (ML) INJECTION PRN
Status: DISCONTINUED | OUTPATIENT
Start: 2018-12-11 | End: 2018-12-11

## 2018-12-11 RX ORDER — ONDANSETRON 4 MG/1
4 TABLET, ORALLY DISINTEGRATING ORAL EVERY 6 HOURS PRN
Status: DISCONTINUED | OUTPATIENT
Start: 2018-12-11 | End: 2018-12-12 | Stop reason: HOSPADM

## 2018-12-11 RX ORDER — PROCHLORPERAZINE MALEATE 10 MG
10 TABLET ORAL EVERY 6 HOURS PRN
Status: DISCONTINUED | OUTPATIENT
Start: 2018-12-11 | End: 2018-12-12 | Stop reason: HOSPADM

## 2018-12-11 RX ORDER — NALOXONE HYDROCHLORIDE 0.4 MG/ML
.1-.4 INJECTION, SOLUTION INTRAMUSCULAR; INTRAVENOUS; SUBCUTANEOUS
Status: DISCONTINUED | OUTPATIENT
Start: 2018-12-11 | End: 2018-12-12 | Stop reason: HOSPADM

## 2018-12-11 RX ORDER — ONDANSETRON 2 MG/ML
4 INJECTION INTRAMUSCULAR; INTRAVENOUS EVERY 6 HOURS PRN
Status: DISCONTINUED | OUTPATIENT
Start: 2018-12-11 | End: 2018-12-12 | Stop reason: HOSPADM

## 2018-12-11 RX ORDER — HYDROMORPHONE HYDROCHLORIDE 1 MG/ML
.3-.5 INJECTION, SOLUTION INTRAMUSCULAR; INTRAVENOUS; SUBCUTANEOUS EVERY 10 MIN PRN
Status: DISCONTINUED | OUTPATIENT
Start: 2018-12-11 | End: 2018-12-11 | Stop reason: HOSPADM

## 2018-12-11 RX ORDER — ONDANSETRON 2 MG/ML
4 INJECTION INTRAMUSCULAR; INTRAVENOUS EVERY 30 MIN PRN
Status: DISCONTINUED | OUTPATIENT
Start: 2018-12-11 | End: 2018-12-11 | Stop reason: HOSPADM

## 2018-12-11 RX ORDER — ONDANSETRON 2 MG/ML
INJECTION INTRAMUSCULAR; INTRAVENOUS PRN
Status: DISCONTINUED | OUTPATIENT
Start: 2018-12-11 | End: 2018-12-11

## 2018-12-11 RX ORDER — KETOROLAC TROMETHAMINE 30 MG/ML
INJECTION, SOLUTION INTRAMUSCULAR; INTRAVENOUS PRN
Status: DISCONTINUED | OUTPATIENT
Start: 2018-12-11 | End: 2018-12-11

## 2018-12-11 RX ORDER — BUPIVACAINE HYDROCHLORIDE 2.5 MG/ML
INJECTION, SOLUTION EPIDURAL; INFILTRATION; INTRACAUDAL
Status: DISCONTINUED
Start: 2018-12-11 | End: 2018-12-11 | Stop reason: HOSPADM

## 2018-12-11 RX ORDER — BUPIVACAINE HYDROCHLORIDE 2.5 MG/ML
INJECTION, SOLUTION INFILTRATION; PERINEURAL PRN
Status: DISCONTINUED | OUTPATIENT
Start: 2018-12-11 | End: 2018-12-11 | Stop reason: HOSPADM

## 2018-12-11 RX ORDER — CEFAZOLIN SODIUM 1 G/3ML
1 INJECTION, POWDER, FOR SOLUTION INTRAMUSCULAR; INTRAVENOUS SEE ADMIN INSTRUCTIONS
Status: DISCONTINUED | OUTPATIENT
Start: 2018-12-11 | End: 2018-12-11 | Stop reason: HOSPADM

## 2018-12-11 RX ORDER — LIDOCAINE HYDROCHLORIDE 20 MG/ML
INJECTION, SOLUTION INFILTRATION; PERINEURAL PRN
Status: DISCONTINUED | OUTPATIENT
Start: 2018-12-11 | End: 2018-12-11

## 2018-12-11 RX ORDER — GLYCOPYRROLATE 0.2 MG/ML
INJECTION, SOLUTION INTRAMUSCULAR; INTRAVENOUS PRN
Status: DISCONTINUED | OUTPATIENT
Start: 2018-12-11 | End: 2018-12-11

## 2018-12-11 RX ADMIN — LIDOCAINE HYDROCHLORIDE 60 MG: 20 INJECTION, SOLUTION INFILTRATION; PERINEURAL at 08:03

## 2018-12-11 RX ADMIN — HYDROMORPHONE HYDROCHLORIDE 0.2 MG: 1 INJECTION, SOLUTION INTRAMUSCULAR; INTRAVENOUS; SUBCUTANEOUS at 12:53

## 2018-12-11 RX ADMIN — FENTANYL CITRATE 50 MCG: 50 INJECTION, SOLUTION INTRAMUSCULAR; INTRAVENOUS at 09:25

## 2018-12-11 RX ADMIN — HYDROMORPHONE HYDROCHLORIDE 0.5 MG: 1 INJECTION, SOLUTION INTRAMUSCULAR; INTRAVENOUS; SUBCUTANEOUS at 09:58

## 2018-12-11 RX ADMIN — FENTANYL CITRATE 100 MCG: 50 INJECTION, SOLUTION INTRAMUSCULAR; INTRAVENOUS at 08:02

## 2018-12-11 RX ADMIN — SODIUM CHLORIDE, POTASSIUM CHLORIDE, SODIUM LACTATE AND CALCIUM CHLORIDE: 600; 310; 30; 20 INJECTION, SOLUTION INTRAVENOUS at 09:38

## 2018-12-11 RX ADMIN — GLYCOPYRROLATE 0.5 MG: 0.2 INJECTION, SOLUTION INTRAMUSCULAR; INTRAVENOUS at 08:47

## 2018-12-11 RX ADMIN — NEOSTIGMINE METHYLSULFATE 3 MG: 1 INJECTION, SOLUTION INTRAVENOUS at 08:47

## 2018-12-11 RX ADMIN — OXYCODONE HYDROCHLORIDE AND ACETAMINOPHEN 1 TABLET: 5; 325 TABLET ORAL at 22:22

## 2018-12-11 RX ADMIN — SCOPALAMINE 1 PATCH: 1 PATCH, EXTENDED RELEASE TRANSDERMAL at 07:29

## 2018-12-11 RX ADMIN — CEFAZOLIN SODIUM 2 G: 2 INJECTION, SOLUTION INTRAVENOUS at 15:41

## 2018-12-11 RX ADMIN — MIDAZOLAM 2 MG: 1 INJECTION INTRAMUSCULAR; INTRAVENOUS at 07:57

## 2018-12-11 RX ADMIN — KETOROLAC TROMETHAMINE 30 MG: 30 INJECTION, SOLUTION INTRAMUSCULAR at 08:53

## 2018-12-11 RX ADMIN — BUPIVACAINE HYDROCHLORIDE 23 ML: 2.5 INJECTION, SOLUTION EPIDURAL; INFILTRATION; INTRACAUDAL; PERINEURAL at 08:58

## 2018-12-11 RX ADMIN — SODIUM CHLORIDE, POTASSIUM CHLORIDE, SODIUM LACTATE AND CALCIUM CHLORIDE: 600; 310; 30; 20 INJECTION, SOLUTION INTRAVENOUS at 07:57

## 2018-12-11 RX ADMIN — DEXAMETHASONE SODIUM PHOSPHATE 4 MG: 4 INJECTION, SOLUTION INTRA-ARTICULAR; INTRALESIONAL; INTRAMUSCULAR; INTRAVENOUS; SOFT TISSUE at 08:15

## 2018-12-11 RX ADMIN — OXYCODONE HYDROCHLORIDE AND ACETAMINOPHEN 1 TABLET: 5; 325 TABLET ORAL at 16:50

## 2018-12-11 RX ADMIN — CEFAZOLIN SODIUM 2 G: 2 INJECTION, SOLUTION INTRAVENOUS at 08:10

## 2018-12-11 RX ADMIN — PROPOFOL 30 MCG/KG/MIN: 10 INJECTION, EMULSION INTRAVENOUS at 08:00

## 2018-12-11 RX ADMIN — DEXTROSE AND SODIUM CHLORIDE: 5; 450 INJECTION, SOLUTION INTRAVENOUS at 11:45

## 2018-12-11 RX ADMIN — FENTANYL CITRATE 50 MCG: 50 INJECTION, SOLUTION INTRAMUSCULAR; INTRAVENOUS at 09:16

## 2018-12-11 RX ADMIN — ROCURONIUM BROMIDE 50 MG: 10 INJECTION INTRAVENOUS at 08:04

## 2018-12-11 RX ADMIN — PROPOFOL 200 MG: 10 INJECTION, EMULSION INTRAVENOUS at 08:03

## 2018-12-11 RX ADMIN — GLYCOPYRROLATE 0.2 MG: 0.2 INJECTION, SOLUTION INTRAMUSCULAR; INTRAVENOUS at 08:19

## 2018-12-11 RX ADMIN — ONDANSETRON 4 MG: 2 INJECTION INTRAMUSCULAR; INTRAVENOUS at 08:46

## 2018-12-11 ASSESSMENT — LIFESTYLE VARIABLES: TOBACCO_USE: 0

## 2018-12-11 ASSESSMENT — MIFFLIN-ST. JEOR: SCORE: 1334.47

## 2018-12-11 NOTE — OP NOTE
Procedure Date: 12/11/2018      REASON FOR ADMISSION:  Enlarging myomatous uterus.      OPERATIVE PROCEDURES:  Laparoscopic-assisted supracervical hysterectomy, bilateral salpingectomy.      OPERATIVE FINDINGS:  The patient had 1 dominant left-sided anterior fibroid and other smaller fibroids throughout the uterine musculature.  Her ovaries looked normal and functional.  Upper abdomen exploration was negative.      OPERATIVE PROCEDURE:  After general anesthesia was induced, the patient was placed in the dorsal lithotomy position and prepped and draped in the usual fashion.  A Aragon catheter was placed.  The endocervical canal was cauterized from below.      Through a subumbilical incision, the Veress needle was placed and 3 liters of CO2 were insufflated.  The laparoscope, trocar and sheath were placed without incident.  Two 5 mm lower quadrant trocars were placed through Marcaine-injected fields and small incisions.  The mesosalpinx, round ligament, broad ligament and uterine artery pedicles were doubly cauterized and cut bilaterally.  At the mid plane of the cervix.  The uterus was excised with the plasma scalpel.  The endocervical canal was cauterized from above.  Hemostasis was excellent.      The left lower quadrant trocar site was expanded up to admit the morcellator.  All tissue was submitted to the pathologist.  Over a liter of fluid was used to irrigate the pelvis.  All sites were reinspected under water and low pressure with no sign of bleeding.      The left lower quadrant trocar site was closed at the level of the peritoneum and fascia with 2-0 Vicryl.  All gas was desufflated.  The incisions were closed with 4-0 Vicryl subcuticular stitches and Steri-Strips.  The estimated blood loss was 25 mL.  The patient tolerated those well and went to the recovery room.  She will be observed tonight and discharged tomorrow when voiding well with good pain control and tolerating a liquid diet.  She will be seen in  the office in 2 weeks for a postoperative check.         SKYLA CRUZ JR, MD             D: 2018   T: 2018   MT: VONDA      Name:     CANDIE MORAN   MRN:      [id number]        Account:        [id number]   :      1975           Procedure Date: 2018      Document: [id number]

## 2018-12-11 NOTE — ANESTHESIA POSTPROCEDURE EVALUATION
Patient: Claire Khan    Procedure(s):  LAPAROSCOPIC HYSTERECTOMY SUPRACERVICAL, BILATERAL SALPINGECTOMY  LAPAROSCOPIC BILATERAL SALPINGECTOMY    Diagnosis:myomas  Diagnosis Additional Information: No value filed.    Anesthesia Type:  General, ETT    Note:  Anesthesia Post Evaluation    Patient location during evaluation: PACU  Patient participation: Able to fully participate in evaluation  Level of consciousness: awake  Pain management: adequate  Airway patency: patent  Cardiovascular status: acceptable  Respiratory status: acceptable  Hydration status: acceptable  PONV: none     Anesthetic complications: None          Last vitals:  Vitals:    12/11/18 0916 12/11/18 0925 12/11/18 0930   BP:   127/80   Resp: 18 16 10   Temp:   35.8  C (96.4  F)   SpO2: 100% 100% 100%         Electronically Signed By: Stefani Mcconnell  December 11, 2018  9:57 AM

## 2018-12-11 NOTE — PLAN OF CARE
VSS. A&O x4. IV Dilaudid and ice for pain. Margo patent. Lap sites CDI. Capno 41, 10. IVF infusing. Tolerating clears. Plan to discharge tomorrow.

## 2018-12-11 NOTE — ANESTHESIA PREPROCEDURE EVALUATION
Anesthesia Pre-Procedure Evaluation    Patient: Claire Khan   MRN: [id number] : 1975          Preoperative Diagnosis: myomas    Procedure(s):  LAPAROSCOPIC HYSTERECTOMY SUPRACERVICAL  LAPAROSCOPIC BILATERAL SALPINGECTOMY    Past Medical History:   Diagnosis Date     Bladder disorder     recent cysto 10/10/2013     Chondromalacia of patella      Chronic low back pain      Coagulation disorder (H)     Factor 5 disorder     Depression, major      Factor V Leiden (H)      Insomnia      Other chronic pain     back, degenerative disc disease     Past Surgical History:   Procedure Laterality Date     ARTHROSCOPY KNEE WITH PATELLAR REALIGNMENT  2012    Procedure: ARTHROSCOPY KNEE WITH PATELLAR REALIGNMENT;  Right Knee Arthroscopy, Right Medial Patellar Femoral Ligament  Reconstruction with Graft, Partial Lateral Facetectomy, Lateral Retinacular Lengthening  ;  Surgeon: Rain Noe MD;  Location:  OR     ARTHROSCOPY KNEE WITH RETINACULAR RELEASE  2014    Procedure: ARTHROSCOPY KNEE WITH RETINACULAR RELEASE MEDIAL OR LATERAL;  Surgeon: Rain Noe MD;  Location:  OR     EXTERNAL EAR SURGERY       HYSTEROSCOPY, ABLATE ENDOMETRIUM VERSAPOINT , COMBINED N/A 2017    Procedure: COMBINED HYSTEROSCOPY, ABLATE ENDOMETRIUM VERSAPOINT;  Surgeon: Sj Samuel MD;  Location: [facility]     PARTIAL HYMENECTOMY/REVISION HYMENAL RING       WISDOM TEETH[         Anesthesia Evaluation     . Pt has had prior anesthetic.     No history of anesthetic complications          ROS/MED HX    ENT/Pulmonary:      (-) tobacco use, asthma and sleep apnea   Neurologic:       Cardiovascular:         METS/Exercise Tolerance:     Hematologic: Comments: Factor v leiden        Musculoskeletal:         GI/Hepatic:        (-) GERD   Renal/Genitourinary:         Endo:         Psychiatric:     (+) psychiatric history depression      Infectious Disease:         Malignancy:         Other:                       "    Physical Exam  Normal systems: dental    Airway   Mallampati: II  TM distance: >3 FB  Neck ROM: full    Dental     Cardiovascular   Rhythm and rate: regular and normal      Pulmonary    breath sounds clear to auscultation            Lab Results   Component Value Date    WBC 5.5 12/17/2012    HGB 15.4 12/03/2018    HCT 43.6 12/17/2012     (L) 12/17/2012    TSH 2.29 12/02/2013    HCG Negative 05/30/2014    HCGS Negative 04/06/2017       Preop Vitals  BP Readings from Last 3 Encounters:   12/11/18 136/89   12/03/18 148/84   11/05/18 122/88    Pulse Readings from Last 3 Encounters:   12/03/18 72   11/05/18 76   05/07/18 68      Resp Readings from Last 3 Encounters:   12/11/18 16   04/06/17 23   05/30/14 16    SpO2 Readings from Last 3 Encounters:   12/11/18 100%   04/06/17 99%   05/30/14 99%      Temp Readings from Last 1 Encounters:   12/11/18 37.4  C (99.3  F) (Temporal)    Ht Readings from Last 1 Encounters:   12/11/18 1.626 m (5' 4\")      Wt Readings from Last 1 Encounters:   12/11/18 68.9 kg (152 lb)    Estimated body mass index is 26.09 kg/m  as calculated from the following:    Height as of this encounter: 1.626 m (5' 4\").    Weight as of this encounter: 68.9 kg (152 lb).       Anesthesia Plan      History & Physical Review  History and physical reviewed and following examination; no interval change.    ASA Status:  2 .        Plan for General and ETT with Intravenous induction. Maintenance will be Balanced.    PONV prophylaxis:  Ondansetron (or other 5HT-3), Dexamethasone or Solumedrol and Scopolamine patch  Propofol infusion    Very anxious would like to hold mask for preoxygenation      Postoperative Care  Postoperative pain management:  IV analgesics.      Consents  Anesthetic plan, risks, benefits and alternatives discussed with:  Patient..                 Stefani Mcconnell  "

## 2018-12-11 NOTE — BRIEF OP NOTE
[facility] Brief Operative Note    Pre-operative diagnosis: myomas   Post-operative diagnosis same   Procedure: Procedure(s):  LAPAROSCOPIC HYSTERECTOMY SUPRACERVICAL, BILATERAL SALPINGECTOMY  LAPAROSCOPIC BILATERAL SALPINGECTOMY   Surgeon(s): Sarahi Samuel   EBL  25 cc   Specimens: ID Type Source Tests Collected by Time Destination   A : Uterus with Bilateral Fallopian Tubes Tissue Uterus,  Bilateral Fallopian Tubes SURGICAL PATHOLOGY EXAM Sj Samuel MD 12/11/2018  8:40 AM       Findings: Multiple myomas

## 2018-12-11 NOTE — PROGRESS NOTES
Admission medication history interview status for the 12/11/2018  admission is complete. See EPIC admission navigator for prior to admission medications     Medication history source reliability:Good    Medication history interview source(s):Patient    Medication history resources (including written lists, pill bottles, clinic record):mailed in list    Primary pharmacy.[facility]    Additional medication history information not noted on PTA med list :None    Time spent in this activity: 60 minutes    Prior to Admission medications    Medication Sig Last Dose Taking? Auth Provider   ALPRAZolam (XANAX PO) Take 1 mg by mouth daily as needed for anxiety  Yes Reported, Patient   ALPRAZolam (XANAX) 1 MG tablet Take 1/2 Tablet by mouth 2 times Daily as needed. 12/10/2018 at pm Yes Reported, Patient   aspirin 325 MG tablet Take 325 mg by mouth daily Past Month at am Yes Reported, Patient   baclofen (LIORESAL) 10 MG tablet TAKE ONE-half TABLET (10 MG) BY MOUTH 2 times DAILY if NEEDED FOR MUSCLE SPASMS Past Month at pm Yes Reported, Patient   BACLOFEN PO Take 10 mg by mouth 2 times daily as needed for muscle spasms  Yes Reported, Patient   Bioflavonoid Products (VITAMIN C) CHEW Take 300-500 mg by mouth 2 times daily as needed  Past Week at am Yes Reported, Patient   CALCIUM PO Take 1,000 mg by mouth daily (2 x 500 mg Chew tab = 1000 mg dose) Past Week at am Yes Reported, Patient   Cholecalciferol (VITAMIN D-3 PO) Take 5,000 Units by mouth daily  Past Week at am Yes Reported, Patient   hydrocortisone (ANUSOL-HC) 25 MG suppository Place 25 mg rectally 2 times daily as needed for hemorrhoids Past Month at prn Yes Reported, Patient   Ibuprofen (ADVIL PO) Take 400-600 mg by mouth 4 times daily as needed for moderate pain Past Month at prn Yes Reported, Patient   medical cannabis (Patient's own supply.  Not a prescription) Take  as instructed. take 1 tab by mouth once daily if needed. Past Month at prn Yes Reported, Patient   Misc  Natural Products (BUTCHERS BROOM PO) Take 1 Dose by mouth daily as needed  Past Month at prn Yes Reported, Patient   Multiple Vitamins-Minerals (MULTIVITAMIN ADULT PO) Take 1 tablet by mouth daily  Past Month at am Yes Reported, Patient   Omega 3-6-9 Fatty Acids (TRIPLE OMEGA COMPLEX PO) Take 1 capsule by mouth daily Past Month at am Yes Reported, Patient   OVER-THE-COUNTER Take 10 mg by mouth daily as needed (Mood) CBD OIL (No THC in this product) Past Month at prn Yes Reported, Patient   pramox-pe-glycerin-petrolatum (PREPARATION H) 1-0.25-14.4-15 % CREA cream Place rectally daily as needed for hemorrhoids Past Week at prn Yes Reported, Patient   psyllium (METAMUCIL/KONSYL) 58.6 % powder Take 1 teaspoonful by mouth daily Past Week at am Yes Reported, Patient   Sertraline HCl (ZOLOFT PO) Take 100 mg by mouth daily  12/10/2018 at am Yes Reported, Patient   TRAMADOL HCL PO Take 100 mg by mouth every 8 hours as needed (50mg x 2 = 100mg) Past Week at prn Yes Reported, Patient   Turmeric POWD Take 0.5 teaspoonful by mouth daily Past Month at prn Yes Reported, Patient   Cyanocobalamin (VITAMIN B12 PO) Take 5,000 Units by mouth daily as needed    Reported, Patient   HYDROcodone-acetaminophen (NORCO) 5-325 MG per tablet Take 1 tablet by mouth every 4 hours as needed for pain  Patient taking differently: Take 0.5 tablets by mouth 2 times daily as needed for pain  More than a month at prn  Sj Samuel MD

## 2018-12-11 NOTE — ANESTHESIA CARE TRANSFER NOTE
Patient: Claire Khan    Procedure(s):  LAPAROSCOPIC HYSTERECTOMY SUPRACERVICAL, BILATERAL SALPINGECTOMY  LAPAROSCOPIC BILATERAL SALPINGECTOMY    Diagnosis: myomas  Diagnosis Additional Information: No value filed.    Anesthesia Type:   General, ETT     Note:  Airway :Face Mask  Patient transferred to:PACU  Comments: Neuromuscular blockade reversed after TOF 4/4, spontaneous respirations, adequate tidal volumes, followed commands to voice, oropharynx suctioned with soft flexible catheter, extubated atraumatically, extubated with suction, airway patent after extubation.  Oxygen via facemask at 10 liters per minute to PACU. Oxygen tubing connected to wall O2 in PACU, SpO2, NiBP, and EKG monitors and alarms on and functioning, Tavia Hugger warmer connected to patient gown, report on patient's clinical status given to PACU RN, RN questions answered. Handoff Report: Identifed the Patient, Identified the Reponsible Provider, Reviewed the pertinent medical history, Discussed the surgical course, Reviewed Intra-OP anesthesia mangement and issues during anesthesia, Set expectations for post-procedure period and Allowed opportunity for questions and acknowledgement of understanding      Vitals: (Last set prior to Anesthesia Care Transfer)    CRNA VITALS  12/11/2018 0831 - 12/11/2018 0905      12/11/2018             NIBP:  133/76    NIBP Mean:  104    Resp Rate (observed):  16                Electronically Signed By: JACQUELINE Daniels CRNA  December 11, 2018  9:05 AM

## 2018-12-12 VITALS
WEIGHT: 152 LBS | BODY MASS INDEX: 25.95 KG/M2 | DIASTOLIC BLOOD PRESSURE: 71 MMHG | TEMPERATURE: 96.7 F | RESPIRATION RATE: 16 BRPM | HEIGHT: 64 IN | SYSTOLIC BLOOD PRESSURE: 112 MMHG | HEART RATE: 51 BPM | OXYGEN SATURATION: 99 %

## 2018-12-12 LAB
GLUCOSE BLDC GLUCOMTR-MCNC: 90 MG/DL (ref 70–99)
HGB BLD-MCNC: 13.6 G/DL (ref 11.7–15.7)

## 2018-12-12 PROCEDURE — 82962 GLUCOSE BLOOD TEST: CPT

## 2018-12-12 PROCEDURE — 25000128 H RX IP 250 OP 636: Performed by: OBSTETRICS & GYNECOLOGY

## 2018-12-12 PROCEDURE — 36415 COLL VENOUS BLD VENIPUNCTURE: CPT | Performed by: OBSTETRICS & GYNECOLOGY

## 2018-12-12 PROCEDURE — 25800025 ZZH RX 258: Performed by: OBSTETRICS & GYNECOLOGY

## 2018-12-12 PROCEDURE — 25000132 ZZH RX MED GY IP 250 OP 250 PS 637: Performed by: OBSTETRICS & GYNECOLOGY

## 2018-12-12 PROCEDURE — 40000934 ZZH STATISTIC OUTPATIENT (NON-OBS) DAY

## 2018-12-12 PROCEDURE — 85018 HEMOGLOBIN: CPT | Performed by: OBSTETRICS & GYNECOLOGY

## 2018-12-12 RX ADMIN — OXYCODONE HYDROCHLORIDE AND ACETAMINOPHEN 2 TABLET: 5; 325 TABLET ORAL at 10:44

## 2018-12-12 RX ADMIN — CEFAZOLIN SODIUM 2 G: 2 INJECTION, SOLUTION INTRAVENOUS at 00:05

## 2018-12-12 RX ADMIN — DEXTROSE AND SODIUM CHLORIDE: 5; 450 INJECTION, SOLUTION INTRAVENOUS at 00:01

## 2018-12-12 RX ADMIN — OXYCODONE HYDROCHLORIDE AND ACETAMINOPHEN 1 TABLET: 5; 325 TABLET ORAL at 06:52

## 2018-12-12 NOTE — PROGRESS NOTES
A&Ox4. VSS on RA. Up SBA. Lap sites CDI. Aragon removed, DTV. Pain controlled w/ percocet and ice. IS at bedside. Tolerating fulls. Passing flatus. AM hgb pending. Plan to discharge. Continue to monitor.

## 2018-12-12 NOTE — PLAN OF CARE
A&Ox4. Up SBA. Tolerating regular diet, denies nausea. VSS on RA. C/o 4/10 abd cramping pain, given percocet prn. 3 lap sites C/D/I, ice applied. Up to BR voiding adequately. No vaginal bleeding present. IS at bedside and encouraged.      Pt given discharge instructions. Questions answered. Discharge medications given and reviewed with patient. Follow up appointment scheduled for 12/20, pt aware. Pt to DC home with .

## 2018-12-12 NOTE — PLAN OF CARE
A/O, VSS, SBA, abd steri strips CDI, no vaginal spotting, Aragon WDL to discontinue tomorrow AM, ice and percocet for pain, LS dim/clear, IS 1500, advanced to full liquid diet. BS, no flatus, ambulated in [place]. Int numbness in left hand but improves with movement, continue to monitor.

## 2018-12-12 NOTE — DISCHARGE INSTRUCTIONS
Discharge Instructions for Laparoscopic Hysterectomy  You had a procedure called laparoscopic hysterectomy. A surgeon removed your uterus using instruments inserted through small incisions in your abdomen. These incisions may be sore. You may also have pain in your upper back or shoulders. This is from the gas used to enlarge your abdomen to allow your healthcare provider to see inside your pelvis and do the procedure. This pain usually goes away in a day or two. It usually takes from 1 to 4 weeks to recover from laparoscopic hysterectomy. Remember, though, that recovery time varies from woman to woman. Here's what you can do to speed your recovery after surgery.  Home care     Continue the incentive spirometer that you learned in the hospital.    Avoid constipation.  ? Eat fruits, vegetables, and whole grains.  ? Drink 6 to 8 glasses of water a day, unless told to do otherwise.  ? Use a laxative or a mild stool softener if your doctor says it's OK.    Shower as usual. Wash your incisions with mild soap and water. Pat dry.    Don't use oils, powders, or lotions on your incisions.    Don't put anything in your vagina until your healthcare provider says it's safe to do so. Don't use tampons or douches. Don't have sex.    Activity    Ask your healthcare provider when you can start driving again. It's usually OK to drive as soon as you are free of pain and able to move comfortably from side to side. Don't drive while you are still taking opioid pain medicine.    Ask others to help with chores and errands while you recover.    Don t lift anything heavier than 10 pounds for 6 weeks.    Don t vacuum or do other strenuous activities until the healthcare provider says it's OK.    Walk as often as you feel able.    Climb stairs slowly and pause after every few steps.       When to call your healthcare provider  Call your doctor right away if you have any of the following:    Fever above 100.4 F (38 C) or chills    Bright red  vaginal bleeding or vaginal bleeding that soaks more than one sanitary pad per hour    A foul smelling discharge from the vagina    Trouble urinating or burning when you urinate    Severe pain or bloating in your abdomen    Redness, swelling, or drainage at your incision sites    Shortness of breath or chest pain    Nausea and vomiting   Date Last Reviewed: 11/1/2017 2000-2018 [employer]. [address] [place] [address]. All rights reserved. This information is not intended as a substitute for professional medical care. Always follow your healthcare professional's instructions.

## 2018-12-13 LAB — COPATH REPORT: NORMAL

## 2018-12-20 ENCOUNTER — OFFICE VISIT (OUTPATIENT)
Dept: OBGYN | Facility: CLINIC | Age: 43
End: 2018-12-20
Payer: COMMERCIAL

## 2018-12-20 VITALS — WEIGHT: 153.4 LBS | BODY MASS INDEX: 26.33 KG/M2 | DIASTOLIC BLOOD PRESSURE: 66 MMHG | SYSTOLIC BLOOD PRESSURE: 110 MMHG

## 2018-12-20 DIAGNOSIS — Z09 POSTOP CHECK: Primary | ICD-10-CM

## 2018-12-20 DIAGNOSIS — Z48.816 AFTERCARE FOLLOWING SURGERY OF THE GENITOURINARY SYSTEM: ICD-10-CM

## 2018-12-20 LAB — HGB BLD-MCNC: 15.2 G/DL (ref 11.7–15.7)

## 2018-12-20 PROCEDURE — 99024 POSTOP FOLLOW-UP VISIT: CPT | Performed by: OBSTETRICS & GYNECOLOGY

## 2018-12-20 PROCEDURE — 85018 HEMOGLOBIN: CPT | Performed by: OBSTETRICS & GYNECOLOGY

## 2018-12-20 PROCEDURE — 36415 COLL VENOUS BLD VENIPUNCTURE: CPT | Performed by: OBSTETRICS & GYNECOLOGY

## 2018-12-20 NOTE — PROGRESS NOTES
The patient is seen at this time for her postoperative check.  She underwent a laparoscopic assisted supracervical hysterectomy with bilateral salpingectomy on December 11, 2018.  Her postop hemoglobin is 15.2 g%.  Her pathology was all benign with leiomyomata.  Her incisions are healing well at this time.  She will return to see us in 3 months for her follow-up visit.  She will return to work January 9.

## 2019-01-25 ENCOUNTER — TELEPHONE (OUTPATIENT)
Dept: OBGYN | Facility: CLINIC | Age: 44
End: 2019-01-25

## 2019-01-25 NOTE — TELEPHONE ENCOUNTER
Pt is wondering if he removed both of her fallopian tubes or just one.  She is also wondering if it is normal to still feel really tired and nauseous of and on.

## 2019-01-25 NOTE — TELEPHONE ENCOUNTER
12/11/18 Laparoscopic-assisted supracervical hysterectomy, bilateral salpingectomy. Pt wanted to know if both Fallopian tubes were removed. Pt informed they were. Asked about using protection with intercourse. Informed she does not need to for pregnancy. Asked if she could take bath now. Informed she could. Asked about feeling tired. Encouraged pt to take good care of her self. Eat well balanced meal. Rest when she can. Some Nausea off and on. No vomiting. Pt states eating something helps with nausea. Informed to be sure to eat a protein with morning and evening meal. Pt asked about exercise. Can go back to the gym.

## 2019-03-21 ENCOUNTER — OFFICE VISIT (OUTPATIENT)
Dept: OBGYN | Facility: CLINIC | Age: 44
End: 2019-03-21
Payer: COMMERCIAL

## 2019-03-21 VITALS
SYSTOLIC BLOOD PRESSURE: 118 MMHG | WEIGHT: 157 LBS | HEIGHT: 64 IN | DIASTOLIC BLOOD PRESSURE: 62 MMHG | HEART RATE: 68 BPM | BODY MASS INDEX: 26.8 KG/M2

## 2019-03-21 DIAGNOSIS — N94.10 DYSPAREUNIA, FEMALE: Primary | ICD-10-CM

## 2019-03-21 PROCEDURE — 99212 OFFICE O/P EST SF 10 MIN: CPT | Performed by: OBSTETRICS & GYNECOLOGY

## 2019-03-21 RX ORDER — SERTRALINE HYDROCHLORIDE 100 MG/1
TABLET, FILM COATED ORAL
Refills: 2 | COMMUNITY
Start: 2019-03-07 | End: 2019-03-21

## 2019-03-21 ASSESSMENT — MIFFLIN-ST. JEOR: SCORE: 1352.15

## 2019-03-21 NOTE — PROGRESS NOTES
SUBJECTIVE:                                                   Claire Khan is a 44 year old female who presents to clinic today for the following health issue(s):  Patient presents with:  RECHECK: 3 month f/u to LASH w/ Bilateral Salpingectomy- states things are going well for most part, has a cold currently      HPI: The patient is seen at this time in long-term follow-up of a supracervical hysterectomy with bilateral salpingectomy.  Patient has  no problems with bowel or bladder function at this time.  She has had no vaginal bleeding.  She is back to full function and activity.      Patient's last menstrual period was 11/29/2018..   Patient is sexually active, No obstetric history on file..  Using hysterectomy for contraception.    reports that  has never smoked. she has never used smokeless tobacco.    STD testing offered?  Declined    Health maintenance updated:  Yes     Problem list and histories reviewed & adjusted, as indicated.  Additional history: as documented.    Patient Active Problem List   Diagnosis     Myoma     Past Surgical History:   Procedure Laterality Date     ARTHROSCOPY KNEE WITH PATELLAR REALIGNMENT  6/29/2012    Procedure: ARTHROSCOPY KNEE WITH PATELLAR REALIGNMENT;  Right Knee Arthroscopy, Right Medial Patellar Femoral Ligament  Reconstruction with Graft, Partial Lateral Facetectomy, Lateral Retinacular Lengthening  ;  Surgeon: Rain Noe MD;  Location:  OR     ARTHROSCOPY KNEE WITH RETINACULAR RELEASE  5/30/2014    Procedure: ARTHROSCOPY KNEE WITH RETINACULAR RELEASE MEDIAL OR LATERAL;  Surgeon: Rain Noe MD;  Location:  OR     EXTERNAL EAR SURGERY       HYSTEROSCOPY, ABLATE ENDOMETRIUM VERSAPOINT , COMBINED N/A 4/6/2017    Procedure: COMBINED HYSTEROSCOPY, ABLATE ENDOMETRIUM VERSAPOINT;  Surgeon: Sj Samuel MD;  Location: [facility]     LAPAROSCOPIC HYSTERECTOMY SUPRACERVICAL N/A 12/11/2018    Procedure: LAPAROSCOPIC HYSTERECTOMY SUPRACERVICAL,  BILATERAL SALPINGECTOMY;  Surgeon: Sj Samuel MD;  Location: [facility]     LAPAROSCOPIC SALPINGECTOMY Bilateral 12/11/2018    Procedure: LAPAROSCOPIC BILATERAL SALPINGECTOMY;  Surgeon: Sj Samuel MD;  Location: [facility]     PARTIAL HYMENECTOMY/REVISION HYMENAL RING       WISDOM TEETH[        Social History     Tobacco Use     Smoking status: Never Smoker     Smokeless tobacco: Never Used   Substance Use Topics     Alcohol use: Yes     Comment: 1 DRINK PER MONTH      Problem (# of Occurrences) Relation (Name,Age of Onset)    C.A.D. (1) Mother    Diabetes (1) Father            Current Outpatient Medications   Medication Sig     AMOXICILLIN PO      aspirin 325 MG tablet Take 325 mg by mouth daily     medical cannabis (Patient's own supply.  Not a prescription) Take  as instructed. take 1 tab by mouth once daily if needed.     Multiple Vitamins-Minerals (MULTIVITAMIN ADULT PO) Take 1 tablet by mouth daily      Sertraline HCl (ZOLOFT PO) Take 100 mg by mouth daily      ALPRAZolam (XANAX) 1 MG tablet Take 1/2 Tablet by mouth 2 times Daily as needed.     baclofen (LIORESAL) 10 MG tablet TAKE ONE-half TABLET (10 MG) BY MOUTH 2 times DAILY if NEEDED FOR MUSCLE SPASMS     TRAMADOL HCL PO Take by mouth as needed      No current facility-administered medications for this visit.      Allergies   Allergen Reactions     Bactrim Other (See Comments)     Blisters or  sores on tongue     Sulfa Drugs Unknown and Other (See Comments)     Mouth Sores  Mouth Sores     Paroxetine Anxiety     Panic attack  Other reaction(s): Tachycardia     Vicodin [Hydrocodone-Acetaminophen] Anxiety     anxiety       ROS:  12 point review of systems negative other than symptoms noted below.  Head: Nasal Congestion and Sore Throat  Respiratory: Cough  Gastrointestinal: Abdominal Pain  Genitourinary: Night Sweats, Painful [place] and Significant PMS  Musculoskeletal: Joint Pain  Psychiatric: Anxiety, Depression, Difficulty Sleeping and  "[place]    OBJECTIVE:     /62   Pulse 68   Ht 1.626 m (5' 4\")   Wt 71.2 kg (157 lb)   LMP 11/29/2018   BMI 26.95 kg/m    Body mass index is 26.95 kg/m .    Exam:  Constitutional:  Appearance: Well nourished, well developed alert, in no acute distress  Gastrointestinal:  Abdominal Examination:  Abdomen nontender to palpation, tone normal without rigidity or guarding, no masses present, umbilicus without lesions; Liver/Spleen:  No hepatomegaly present, liver nontender to palpation; Hernias:  No hernias present  Lymphatic: Lymph Nodes:  No other lymphadenopathy present  Skin:General Inspection:  No rashes present, no lesions present, no areas of discoloration; Genitalia and Groin:  No rashes present, no lesions present, no areas of discoloration, no masses present.  Neurologic/Psychiatric:  Mental Status:  Oriented X3   Pelvic Exam:  External Genitalia:     Normal appearance for age, no discharge present, no tenderness present, no inflammatory lesions present, color normal  Vagina:     Normal vaginal vault without central or paravaginal defects, no discharge present, no inflammatory lesions present, no masses present  Bladder:     Nontender to palpation  Urethra:   Urethral Body:  Urethra palpation normal, urethra structural support normal   Urethral Meatus:  No erythema or lesions present  Cervix:     Appearance healthy, no lesions present, nontender to palpation, no bleeding present  Uterus:     Surgically absent  Adnexa:     No adnexal tenderness present, no adnexal masses present  Perineum:     Perineum within normal limits, no evidence of trauma, no rashes or skin lesions present  Anus:     Anus within normal limits, no hemorrhoids present  Inguinal Lymph Nodes:     No lymphadenopathy present  Pubic Hair:     Normal pubic hair distribution for age  Genitalia and Groin:     No rashes present, no lesions present, no areas of discoloration, no masses present       In-Clinic Test " Results:      ASSESSMENT/PLAN:                                                        Patient with supracervical hysterectomy and salpingectomy.  Although she did have some discomfort with intercourse her exam is completely unremarkable today.  She has due for her mammogram update and Pap in May        Sj Samuel MD  [facility]

## 2019-06-19 ENCOUNTER — ANCILLARY PROCEDURE (OUTPATIENT)
Dept: MAMMOGRAPHY | Facility: CLINIC | Age: 44
End: 2019-06-19
Payer: COMMERCIAL

## 2019-06-19 DIAGNOSIS — Z12.31 VISIT FOR SCREENING MAMMOGRAM: ICD-10-CM

## 2019-06-19 PROCEDURE — 77067 SCR MAMMO BI INCL CAD: CPT | Mod: TC

## 2019-09-30 NOTE — PROGRESS NOTES
SUBJECTIVE:                                                   Claire Khan is a 44 year old female who presents to clinic today for the following health issue(s):  Patient presents with:  Gyn Exam: Pap  Pelvic Pain      HPI: The patient is seen at this time for her annual GYN and Pap.  She underwent a laparoscopic assisted supracervical hysterectomy with bilateral salpingectomy in December 2018.  She is having no GYN issues at all.  She does have some low back pain but has no constipation and no bladder symptoms.  She has had no bleeding.  Pap smear is due today.      Patient's last menstrual period was 11/29/2018..     Patient is sexually active, No obstetric history on file..  Using hysterectomy for contraception.    reports that she has never smoked. She has never used smokeless tobacco.    STD testing offered?  Declined    Health maintenance updated:  yes    Today's PHQ-2 Score:   PHQ-2 ( 1999 Pfizer) 12/3/2018   Q1: Little interest or pleasure in doing things 1   Q2: Feeling down, depressed or hopeless 1   PHQ-2 Score 2     Today's PHQ-9 Score: No flowsheet data found.  Today's PINA-7 Score: No flowsheet data found.    Problem list and histories reviewed & adjusted, as indicated.  Additional history: as documented.    Patient Active Problem List   Diagnosis     Myoma     Past Surgical History:   Procedure Laterality Date     ARTHROSCOPY KNEE WITH PATELLAR REALIGNMENT  6/29/2012    Procedure: ARTHROSCOPY KNEE WITH PATELLAR REALIGNMENT;  Right Knee Arthroscopy, Right Medial Patellar Femoral Ligament  Reconstruction with Graft, Partial Lateral Facetectomy, Lateral Retinacular Lengthening  ;  Surgeon: Rain Noe MD;  Location:  OR     ARTHROSCOPY KNEE WITH RETINACULAR RELEASE  5/30/2014    Procedure: ARTHROSCOPY KNEE WITH RETINACULAR RELEASE MEDIAL OR LATERAL;  Surgeon: Rain Noe MD;  [facility]     EXTERNAL EAR SURGERY       HYSTEROSCOPY, ABLATE ENDOMETRIUM VERSAPOINT ,  COMBINED N/A 4/6/2017    Procedure: COMBINED HYSTEROSCOPY, ABLATE ENDOMETRIUM VERSAPOINT;  Surgeon: Sj Samuel MD;  Location: [facility]     LAPAROSCOPIC HYSTERECTOMY SUPRACERVICAL N/A 12/11/2018    Procedure: LAPAROSCOPIC HYSTERECTOMY SUPRACERVICAL, BILATERAL SALPINGECTOMY;  Surgeon: Sj Samuel MD;  Location: [facility]     LAPAROSCOPIC SALPINGECTOMY Bilateral 12/11/2018    Procedure: LAPAROSCOPIC BILATERAL SALPINGECTOMY;  Surgeon: Sj Samuel MD;  Location: [facility]     PARTIAL HYMENECTOMY/REVISION HYMENAL RING       WISDOM TEETH[        Social History     Tobacco Use     Smoking status: Never Smoker     Smokeless tobacco: Never Used   Substance Use Topics     Alcohol use: Yes     Comment: 1 DRINK PER MONTH      Problem (# of Occurrences) Relation (Name,Age of Onset)    C.A.D. (1) Mother    Diabetes (1) Father            Current Outpatient Medications   Medication Sig     ALPRAZolam (XANAX) 1 MG tablet Take 1/2 Tablet by mouth 2 times Daily as needed.     aspirin 325 MG tablet Take 325 mg by mouth daily     baclofen (LIORESAL) 10 MG tablet TAKE ONE-half TABLET (10 MG) BY MOUTH 2 times DAILY if NEEDED FOR MUSCLE SPASMS     medical cannabis (Patient's own supply.  Not a prescription) Take  as instructed. take 1 tab by mouth once daily if needed.     Multiple Vitamins-Minerals (MULTIVITAMIN ADULT PO) Take 1 tablet by mouth daily      OXYBUTYNIN CHLORIDE ER PO      Sertraline HCl (ZOLOFT PO) Take 100 mg by mouth daily      TRAMADOL HCL PO Take by mouth as needed      No current facility-administered medications for this visit.      Allergies   Allergen Reactions     Bactrim Other (See Comments)     Blisters or  sores on tongue     Sulfa Drugs Unknown and Other (See Comments)     Mouth Sores  Mouth Sores     Paroxetine Anxiety     Panic attack  Other reaction(s): Tachycardia     Vicodin [Hydrocodone-Acetaminophen] Anxiety     anxiety       ROS:  12 point review of systems negative other than symptoms noted  "below.  Genitourinary: Pelvic Pain    OBJECTIVE:     /68   Ht 1.626 m (5' 4\")   Wt 71.4 kg (157 lb 6.4 oz)   LMP 11/29/2018   Breastfeeding? No   BMI 27.02 kg/m    Body mass index is 27.02 kg/m .    Exam:  Constitutional:  Appearance: Well nourished, well developed alert, in no acute distress  Gastrointestinal:  Abdominal Examination:  Abdomen nontender to palpation, tone normal without rigidity or guarding, no masses present, umbilicus without lesions; Liver/Spleen:  No hepatomegaly present, liver nontender to palpation; Hernias:  No hernias present  Lymphatic: Lymph Nodes:  No other lymphadenopathy present  Skin: General Inspection:  No rashes present, no lesions present, no areas of discoloration.  Neurologic:  Mental Status:  Oriented X3.  Normal strength and tone, sensory exam grossly normal, mentation intact and speech normal.    Psychiatric:  Mentation appears normal and affect normal/bright.  Pelvic Exam:  External Genitalia:     Normal appearance for age, no discharge present, no tenderness present, no inflammatory lesions present, color normal  Vagina:     Normal vaginal vault without central or paravaginal defects, no discharge present, no inflammatory lesions present, no masses present  Bladder:     Nontender to palpation  Urethra:   Urethral Body:  Urethra palpation normal, urethra structural support normal   Urethral Meatus:  No erythema or lesions present  Cervix:     Appearance healthy, no lesions present, nontender to palpation, no bleeding present  Uterus:     Surgically absent  Adnexa:     No adnexal tenderness present, no adnexal masses present  Perineum:     Perineum within normal limits, no evidence of trauma, no rashes or skin lesions present  Anus:     Anus within normal limits, no hemorrhoids present  Inguinal Lymph Nodes:     No lymphadenopathy present  Pubic Hair:     Normal pubic hair distribution for age  Genitalia and Groin:     No rashes present, no lesions present, no areas " of discoloration, no masses present       In-Clinic Test Results:      ASSESSMENT/PLAN:                                                        ICD-10-CM    1. Pelvic pain R10.2 Urine Culture Aerobic Bacterial     UA without Microscopic     CANCELED: UA without Microscopic   2. Screening for cervical cancer Z12.4 Pap imaged thin layer screen with HPV - recommended age 30 - 65     HPV High Risk Types DNA Cervical     Patient with successful surgery for treatment of large fibroids that were benign.  I believe her low back pain has no GYN origin and is probably more related to her core laxity and we recommend some physical activity and training.  We will contact her with her Pap results.  We recommend yearly mammography which she will repeat in June 2020 on time          Sj Samuel MD  [facility] FOR [facility] [place]

## 2019-10-01 ENCOUNTER — OFFICE VISIT (OUTPATIENT)
Dept: OBGYN | Facility: CLINIC | Age: 44
End: 2019-10-01
Payer: COMMERCIAL

## 2019-10-01 VITALS
HEIGHT: 64 IN | SYSTOLIC BLOOD PRESSURE: 106 MMHG | BODY MASS INDEX: 26.87 KG/M2 | WEIGHT: 157.4 LBS | DIASTOLIC BLOOD PRESSURE: 68 MMHG

## 2019-10-01 DIAGNOSIS — R10.2 PELVIC PAIN: Primary | ICD-10-CM

## 2019-10-01 DIAGNOSIS — Z12.4 SCREENING FOR CERVICAL CANCER: ICD-10-CM

## 2019-10-01 LAB
ALBUMIN UR-MCNC: NEGATIVE MG/DL
APPEARANCE UR: CLEAR
BILIRUB UR QL STRIP: NEGATIVE
COLOR UR AUTO: YELLOW
GLUCOSE UR STRIP-MCNC: NEGATIVE MG/DL
HGB UR QL STRIP: NEGATIVE
KETONES UR STRIP-MCNC: NEGATIVE MG/DL
LEUKOCYTE ESTERASE UR QL STRIP: NEGATIVE
NITRATE UR QL: NEGATIVE
PH UR STRIP: 6 PH (ref 5–7)
SOURCE: NORMAL
SP GR UR STRIP: <=1.005 (ref 1–1.03)
UROBILINOGEN UR STRIP-ACNC: 0.2 EU/DL (ref 0.2–1)

## 2019-10-01 PROCEDURE — G0145 SCR C/V CYTO,THINLAYER,RESCR: HCPCS | Performed by: OBSTETRICS & GYNECOLOGY

## 2019-10-01 PROCEDURE — 87624 HPV HI-RISK TYP POOLED RSLT: CPT | Performed by: OBSTETRICS & GYNECOLOGY

## 2019-10-01 PROCEDURE — 87086 URINE CULTURE/COLONY COUNT: CPT | Performed by: OBSTETRICS & GYNECOLOGY

## 2019-10-01 PROCEDURE — 81003 URINALYSIS AUTO W/O SCOPE: CPT | Performed by: OBSTETRICS & GYNECOLOGY

## 2019-10-01 PROCEDURE — 99213 OFFICE O/P EST LOW 20 MIN: CPT | Performed by: OBSTETRICS & GYNECOLOGY

## 2019-10-01 ASSESSMENT — MIFFLIN-ST. JEOR: SCORE: 1353.96

## 2019-10-02 LAB
BACTERIA SPEC CULT: NO GROWTH
Lab: NORMAL
SPECIMEN SOURCE: NORMAL

## 2019-10-03 LAB
COPATH REPORT: NORMAL
PAP: NORMAL

## 2019-10-07 ENCOUNTER — MYC MEDICAL ADVICE (OUTPATIENT)
Dept: OBGYN | Facility: CLINIC | Age: 44
End: 2019-10-07

## 2019-10-08 LAB
FINAL DIAGNOSIS: NORMAL
HPV HR 12 DNA CVX QL NAA+PROBE: NEGATIVE
HPV16 DNA SPEC QL NAA+PROBE: NEGATIVE
HPV18 DNA SPEC QL NAA+PROBE: NEGATIVE
SPECIMEN DESCRIPTION: NORMAL
SPECIMEN SOURCE CVX/VAG CYTO: NORMAL

## 2019-10-09 NOTE — TELEPHONE ENCOUNTER
Triage research found:  3% Slidenafil, 0.2% Nitroglycerin compound in a paraben-free ointment base that is hypoallergenic. The cream is applied directly to the clitoris 30 minutes prior to sexual activity with effects lasting up to 2 hours.     Routing pt [employer] message to provider to advise.  Susanne Fuentes RN on 10/9/2019 at 12:18 PM

## 2019-10-09 NOTE — TELEPHONE ENCOUNTER
Spoke with pharmacist at [facility]. Will send RX. They will call her for billing/insurance information.    If they have questions about RX, compounding pharmacist will call us.

## 2019-10-30 ENCOUNTER — HEALTH MAINTENANCE LETTER (OUTPATIENT)
Age: 44
End: 2019-10-30

## 2019-11-19 NOTE — PROGRESS NOTES
SUBJECTIVE:                                                   Claire Khan is a 44 year old female who presents to clinic today for the following health issue(s):  Patient presents with:  Vaginal Problem: has vaginal blisters she would like to be looked at.        HPI: The patient is seen at this time for lesions on the right side of her labia.  They have been there for approximately 2 weeks.  Her primary care physician treated this with antibiotics.  The patient did try to manipulate each of these areas.  She had a herpes culture that was negative.      Patient's last menstrual period was 11/29/2018..     Patient is sexually active, No obstetric history on file..  Using hysterectomy for contraception.    reports that she has never smoked. She has never used smokeless tobacco.    STD testing offered?  Declined    Health maintenance updated:  yes    Today's PHQ-2 Score:   PHQ-2 ( 1999 Pfizer) 12/3/2018   Q1: Little interest or pleasure in doing things 1   Q2: Feeling down, depressed or hopeless 1   PHQ-2 Score 2     Today's PHQ-9 Score: No flowsheet data found.  Today's PINA-7 Score: No flowsheet data found.    Problem list and histories reviewed & adjusted, as indicated.  Additional history: as documented.    Patient Active Problem List   Diagnosis     Myoma     Past Surgical History:   Procedure Laterality Date     ARTHROSCOPY KNEE WITH PATELLAR REALIGNMENT  6/29/2012    Procedure: ARTHROSCOPY KNEE WITH PATELLAR REALIGNMENT;  Right Knee Arthroscopy, Right Medial Patellar Femoral Ligament  Reconstruction with Graft, Partial Lateral Facetectomy, Lateral Retinacular Lengthening  ;  Surgeon: Rain Noe MD;  Location:  OR     ARTHROSCOPY KNEE WITH RETINACULAR RELEASE  5/30/2014    Procedure: ARTHROSCOPY KNEE WITH RETINACULAR RELEASE MEDIAL OR LATERAL;  Surgeon: Rain Noe MD;  [facility]     EXTERNAL EAR SURGERY       HYSTEROSCOPY, ABLATE ENDOMETRIUM VERSAPOINT , COMBINED N/A  4/6/2017    Procedure: COMBINED HYSTEROSCOPY, ABLATE ENDOMETRIUM VERSAPOINT;  Surgeon: Sj Samuel MD;  Location: [facility]     LAPAROSCOPIC HYSTERECTOMY SUPRACERVICAL N/A 12/11/2018    Procedure: LAPAROSCOPIC HYSTERECTOMY SUPRACERVICAL, BILATERAL SALPINGECTOMY;  Surgeon: Sj Samuel MD;  Location: [facility]     LAPAROSCOPIC SALPINGECTOMY Bilateral 12/11/2018    Procedure: LAPAROSCOPIC BILATERAL SALPINGECTOMY;  Surgeon: Sj Samuel MD;  Location: [facility]     PARTIAL HYMENECTOMY/REVISION HYMENAL RING       WISDOM TEETH[        Social History     Tobacco Use     Smoking status: Never Smoker     Smokeless tobacco: Never Used   Substance Use Topics     Alcohol use: Yes     Comment: 1 DRINK PER MONTH      Problem (# of Occurrences) Relation (Name,Age of Onset)    C.A.D. (1) Mother    Diabetes (1) Father            Current Outpatient Medications   Medication Sig     ALPRAZolam (XANAX) 1 MG tablet Take 1/2 Tablet by mouth 2 times Daily as needed.     aspirin 325 MG tablet Take 325 mg by mouth daily     baclofen (LIORESAL) 10 MG tablet TAKE ONE-half TABLET (10 MG) BY MOUTH 2 times DAILY if NEEDED FOR MUSCLE SPASMS     medical cannabis (Patient's own supply.  Not a prescription) Take  as instructed. take 1 tab by mouth once daily if needed.     Multiple Vitamins-Minerals (MULTIVITAMIN ADULT PO) Take 1 tablet by mouth daily      OXYBUTYNIN CHLORIDE ER PO      Sertraline HCl (ZOLOFT PO) Take 100 mg by mouth daily      TRAMADOL HCL PO Take by mouth as needed      COMPOUNDED NON-CONTROLLED SUBSTANCE (CMPD RX) - PHARMACY TO MIX COMPOUNDED MEDICATION Slidenafil 3%, Nitroglycerin 0.2% paraben-free ointment base-hypoallergenic. Apply a small amount of ointment directly to clitoris 30 minutes prior to sexual activity. (Patient not taking: Reported on 11/20/2019)     No current facility-administered medications for this visit.      Allergies   Allergen Reactions     Bactrim Other (See Comments)     Blisters or  sores on tongue  "    Sulfa Drugs Unknown and Other (See Comments)     Mouth Sores  Mouth Sores     Paroxetine Anxiety     Panic attack  Other reaction(s): Tachycardia     Vicodin [Hydrocodone-Acetaminophen] Anxiety     anxiety       ROS:  12 point review of systems negative other than symptoms noted below or in the HPI.  Skin: New Skin Lesions  No urinary frequency or dysuria, bladder or kidney problems      OBJECTIVE:     /76   Pulse 72   Ht 1.626 m (5' 4\")   Wt 69.4 kg (153 lb)   LMP 11/29/2018   BMI 26.26 kg/m    Body mass index is 26.26 kg/m .    Exam:  Constitutional:  Appearance: Well nourished, well developed alert, in no acute distress    Pelvic Exam:  External Genitalia:     Normal appearance for age, no discharge present, no tenderness present, no inflammatory lesions present, color normal  Vagina:     Normal vaginal vault without central or paravaginal defects, no discharge present, no inflammatory lesions present, no masses present  Bladder:     Nontender to palpation  Urethra:   Urethral Body:  Urethra palpation normal, urethra structural support normal   Urethral Meatus:  No erythema or lesions present  Cervix:     Appearance healthy, no lesions present, nontender to palpation, no bleeding present  Uterus:     Uterus: firm, normal sized and nontender, midplane in position.   Adnexa:     No adnexal tenderness present, no adnexal masses present  Perineum:     Perineum within normal limits, no evidence of trauma, no rashes or skin lesions present  Anus:     Anus within normal limits, no hemorrhoids present  Inguinal Lymph Nodes:     No lymphadenopathy present  Pubic Hair:     Normal pubic hair distribution for age  Genitalia and Groin:     No rashes present, no lesions present, no areas of discoloration, no masses present       In-Clinic Test Results:      ASSESSMENT/PLAN:                                                        Patient with vulvar lesions that appear to be infected sebaceous cyst and not " herpetic.  Sitz baths will be in line.          Sj Samuel MD  [facility] FOR [facility]

## 2019-11-20 ENCOUNTER — OFFICE VISIT (OUTPATIENT)
Dept: OBGYN | Facility: CLINIC | Age: 44
End: 2019-11-20
Payer: COMMERCIAL

## 2019-11-20 VITALS
BODY MASS INDEX: 26.12 KG/M2 | HEART RATE: 72 BPM | HEIGHT: 64 IN | WEIGHT: 153 LBS | DIASTOLIC BLOOD PRESSURE: 76 MMHG | SYSTOLIC BLOOD PRESSURE: 110 MMHG

## 2019-11-20 DIAGNOSIS — N90.89 VULVAR LESION: Primary | ICD-10-CM

## 2019-11-20 PROCEDURE — 99212 OFFICE O/P EST SF 10 MIN: CPT | Performed by: OBSTETRICS & GYNECOLOGY

## 2019-11-20 ASSESSMENT — MIFFLIN-ST. JEOR: SCORE: 1334

## 2019-11-22 ENCOUNTER — MYC REFILL (OUTPATIENT)
Dept: OBGYN | Facility: CLINIC | Age: 44
End: 2019-11-22

## 2019-11-22 NOTE — TELEPHONE ENCOUNTER
Requested Prescriptions   Pending Prescriptions Disp Refills     COMPOUNDED NON-CONTROLLED SUBSTANCE (CMPD RX) - PHARMACY TO MIX COMPOUNDED MEDICATION 30 g 1     Sig: Slidenafil 3%, Nitroglycerin 0.2% paraben-free ointment base-hypoallergenic. Apply a small amount of ointment directly to clitoris 30 minutes prior to sexual activity.       There is no refill protocol information for this order

## 2019-12-03 ENCOUNTER — OFFICE VISIT (OUTPATIENT)
Dept: OBGYN | Facility: CLINIC | Age: 44
End: 2019-12-03
Payer: COMMERCIAL

## 2019-12-03 ENCOUNTER — TELEPHONE (OUTPATIENT)
Dept: OBGYN | Facility: CLINIC | Age: 44
End: 2019-12-03

## 2019-12-03 VITALS
BODY MASS INDEX: 26.02 KG/M2 | HEIGHT: 65 IN | DIASTOLIC BLOOD PRESSURE: 70 MMHG | WEIGHT: 156.2 LBS | SYSTOLIC BLOOD PRESSURE: 110 MMHG

## 2019-12-03 DIAGNOSIS — Z11.4 SCREENING FOR HIV (HUMAN IMMUNODEFICIENCY VIRUS): ICD-10-CM

## 2019-12-03 DIAGNOSIS — Z11.8 SCREENING FOR CHLAMYDIAL DISEASE: ICD-10-CM

## 2019-12-03 DIAGNOSIS — Z11.3 SCREEN FOR STD (SEXUALLY TRANSMITTED DISEASE): Primary | ICD-10-CM

## 2019-12-03 DIAGNOSIS — A60.04 HERPES SIMPLEX VULVOVAGINITIS: ICD-10-CM

## 2019-12-03 DIAGNOSIS — R35.0 FREQUENT URINATION: ICD-10-CM

## 2019-12-03 LAB
ALBUMIN UR-MCNC: NEGATIVE MG/DL
APPEARANCE UR: CLEAR
BILIRUB UR QL STRIP: NEGATIVE
COLOR UR AUTO: YELLOW
GLUCOSE UR STRIP-MCNC: NEGATIVE MG/DL
HGB UR QL STRIP: NEGATIVE
KETONES UR STRIP-MCNC: NEGATIVE MG/DL
LEUKOCYTE ESTERASE UR QL STRIP: NEGATIVE
NITRATE UR QL: NEGATIVE
PH UR STRIP: 6 PH (ref 5–7)
SOURCE: NORMAL
SP GR UR STRIP: 1.02 (ref 1–1.03)
UROBILINOGEN UR STRIP-ACNC: 0.2 EU/DL (ref 0.2–1)

## 2019-12-03 PROCEDURE — 99213 OFFICE O/P EST LOW 20 MIN: CPT | Performed by: OBSTETRICS & GYNECOLOGY

## 2019-12-03 PROCEDURE — 36415 COLL VENOUS BLD VENIPUNCTURE: CPT | Performed by: OBSTETRICS & GYNECOLOGY

## 2019-12-03 PROCEDURE — 87389 HIV-1 AG W/HIV-1&-2 AB AG IA: CPT | Performed by: OBSTETRICS & GYNECOLOGY

## 2019-12-03 PROCEDURE — 86696 HERPES SIMPLEX TYPE 2 TEST: CPT | Performed by: OBSTETRICS & GYNECOLOGY

## 2019-12-03 PROCEDURE — 81003 URINALYSIS AUTO W/O SCOPE: CPT | Performed by: OBSTETRICS & GYNECOLOGY

## 2019-12-03 PROCEDURE — 86695 HERPES SIMPLEX TYPE 1 TEST: CPT | Performed by: OBSTETRICS & GYNECOLOGY

## 2019-12-03 PROCEDURE — 87086 URINE CULTURE/COLONY COUNT: CPT | Performed by: OBSTETRICS & GYNECOLOGY

## 2019-12-03 PROCEDURE — 86803 HEPATITIS C AB TEST: CPT | Performed by: OBSTETRICS & GYNECOLOGY

## 2019-12-03 PROCEDURE — 86780 TREPONEMA PALLIDUM: CPT | Performed by: OBSTETRICS & GYNECOLOGY

## 2019-12-03 PROCEDURE — 87340 HEPATITIS B SURFACE AG IA: CPT | Performed by: OBSTETRICS & GYNECOLOGY

## 2019-12-03 RX ORDER — VALACYCLOVIR HYDROCHLORIDE 1 G/1
1000 TABLET, FILM COATED ORAL 2 TIMES DAILY
Qty: 14 TABLET | Refills: 3 | Status: SHIPPED | OUTPATIENT
Start: 2019-12-03 | End: 2019-12-12

## 2019-12-03 ASSESSMENT — MIFFLIN-ST. JEOR: SCORE: 1356.46

## 2019-12-03 NOTE — PROGRESS NOTES
SUBJECTIVE:                                                   Claire Khan is a 44 year old female who presents to clinic today for the following health issue(s):  Patient presents with:  Consult: Patient states that she has some vaginal sores and or bumps in her labia area.       HPI: The patient is seen at this time for persistent labial sores and bumps.  She has a past history of canker sores.  We saw her last week.  She has not been on any antiviral as she reacted to Zovirax that another physician given her.      Patient's last menstrual period was 11/29/2018..     Patient is sexually active, No obstetric history on file..  Using hysterectomy for contraception.    reports that she has never smoked. She has never used smokeless tobacco.    STD testing offered?  Accepted    Health maintenance updated:  yes    Today's PHQ-2 Score:   PHQ-2 ( 1999 Pfizer) 12/3/2018   Q1: Little interest or pleasure in doing things 1   Q2: Feeling down, depressed or hopeless 1   PHQ-2 Score 2     Today's PHQ-9 Score: No flowsheet data found.  Today's PINA-7 Score: No flowsheet data found.    Problem list and histories reviewed & adjusted, as indicated.  Additional history: as documented.    Patient Active Problem List   Diagnosis     Myoma     Past Surgical History:   Procedure Laterality Date     ARTHROSCOPY KNEE WITH PATELLAR REALIGNMENT  6/29/2012    Procedure: ARTHROSCOPY KNEE WITH PATELLAR REALIGNMENT;  Right Knee Arthroscopy, Right Medial Patellar Femoral Ligament  Reconstruction with Graft, Partial Lateral Facetectomy, Lateral Retinacular Lengthening  ;  Surgeon: Rain Noe MD;  [facility]     ARTHROSCOPY KNEE WITH RETINACULAR RELEASE  5/30/2014    Procedure: ARTHROSCOPY KNEE WITH RETINACULAR RELEASE MEDIAL OR LATERAL;  Surgeon: Rain Noe MD;  [facility]     EXTERNAL EAR SURGERY       HYSTEROSCOPY, ABLATE ENDOMETRIUM VERSAPOINT , COMBINED N/A 4/6/2017    Procedure: COMBINED HYSTEROSCOPY,  ABLATE ENDOMETRIUM VERSAPOINT;  Surgeon: Sj Samuel MD;  Location: [facility]     LAPAROSCOPIC HYSTERECTOMY SUPRACERVICAL N/A 12/11/2018    Procedure: LAPAROSCOPIC HYSTERECTOMY SUPRACERVICAL, BILATERAL SALPINGECTOMY;  Surgeon: Sj Samuel MD;  Location: [facility]     LAPAROSCOPIC SALPINGECTOMY Bilateral 12/11/2018    Procedure: LAPAROSCOPIC BILATERAL SALPINGECTOMY;  Surgeon: Sj Samuel MD;  Location: [facility]     PARTIAL HYMENECTOMY/REVISION HYMENAL RING       WISDOM TEETH[        Social History     Tobacco Use     Smoking status: Never Smoker     Smokeless tobacco: Never Used   Substance Use Topics     Alcohol use: Yes     Comment: 1 DRINK PER MONTH      Problem (# of Occurrences) Relation (Name,Age of Onset)    C.A.D. (1) Mother    Diabetes (1) Father            Current Outpatient Medications   Medication Sig     ALPRAZolam (XANAX) 1 MG tablet Take 1/2 Tablet by mouth 2 times Daily as needed.     aspirin 325 MG tablet Take 325 mg by mouth daily     baclofen (LIORESAL) 10 MG tablet TAKE ONE-half TABLET (10 MG) BY MOUTH 2 times DAILY if NEEDED FOR MUSCLE SPASMS     COMPOUNDED NON-CONTROLLED SUBSTANCE (CMPD RX) - PHARMACY TO MIX COMPOUNDED MEDICATION Slidenafil 3%, Nitroglycerin 0.2% paraben-free ointment base-hypoallergenic. Apply a small amount of ointment directly to clitoris 30 minutes prior to sexual activity.     medical cannabis (Patient's own supply.  Not a prescription) Take  as instructed. take 1 tab by mouth once daily if needed.     Multiple Vitamins-Minerals (MULTIVITAMIN ADULT PO) Take 1 tablet by mouth daily      OXYBUTYNIN CHLORIDE ER PO      Sertraline HCl (ZOLOFT PO) Take 100 mg by mouth daily      TRAMADOL HCL PO Take by mouth as needed      No current facility-administered medications for this visit.      Allergies   Allergen Reactions     Bactrim Other (See Comments)     Blisters or  sores on tongue     Sulfa Drugs Unknown and Other (See Comments)     Mouth Sores  Mouth Sores      "Paroxetine Anxiety     Panic attack  Other reaction(s): Tachycardia     Vicodin [Hydrocodone-Acetaminophen] Anxiety     anxiety       ROS:  12 point review of systems negative other than symptoms noted below or in the HPI.  No urinary frequency or dysuria, bladder or kidney problems      OBJECTIVE:     /70   Ht 1.638 m (5' 4.5\")   Wt 70.9 kg (156 lb 3.2 oz)   LMP 11/29/2018   Breastfeeding No   BMI 26.40 kg/m    Body mass index is 26.4 kg/m .    Exam:  Constitutional:  Appearance: Well nourished, well developed alert, in no acute distress  Gastrointestinal:  Abdominal Examination:  Abdomen nontender to palpation, tone normal without rigidity or guarding, no masses present, umbilicus without lesions; Liver/Spleen:  No hepatomegaly present, liver nontender to palpation; Hernias:  No hernias present  Lymphatic: Lymph Nodes:  No other lymphadenopathy present  Skin: General Inspection:  No rashes present, no lesions present, no areas of discoloration.  Neurologic:  Mental Status:  Oriented X3.  Normal strength and tone, sensory exam grossly normal, mentation intact and speech normal.    Psychiatric:  Mentation appears normal and affect normal/bright.  Pelvic Exam:  External Genitalia: 2 remaining but healing herpetic lesions in the anterior third of the labia mages bilaterally.   Normal appearance for age, no discharge present, no tenderness present, no inflammatory lesions present, color normal  Vagina:     Normal vaginal vault without central or paravaginal defects, no discharge present, no inflammatory lesions present, no masses present  Bladder:     Nontender to palpation  Urethra:   Urethral Body:  Urethra palpation normal, urethra structural support normal   Urethral Meatus:  No erythema or lesions present  Cervix:     Appearance healthy, no lesions present, nontender to palpation, no bleeding present  Uterus:     Uterus: firm, normal sized and nontender, midplane in position.   Adnexa:     No adnexal " tenderness present, no adnexal masses present  Perineum:     Perineum within normal limits, no evidence of trauma, no rashes or skin lesions present  Anus:     Anus within normal limits, no hemorrhoids present  Inguinal Lymph Nodes:     No lymphadenopathy present  Pubic Hair:     Normal pubic hair distribution for age  Genitalia and Groin:     No rashes present, no lesions present, no areas of discoloration, no masses present       In-Clinic Test Results:      ASSESSMENT/PLAN:                                                        ICD-10-CM    1. Screen for STD (sexually transmitted disease) Z11.3 NEISSERIA GONORRHOEA PCR     Treponema Abs w Reflex to RPR and Titer     Herpes Simplex Virus 1 and 2 IgG     Hepatitis B Surface  Antigen     Hepatitis C Antibody   2. Screening for chlamydial disease Z11.8 CHLAMYDIA TRACHOMATIS PCR   3. Screening for HIV (human immunodeficiency virus) Z11.4 HIV Antigen Antibody Combo         Patient with healing genital herpes.  We have asked her to try some Valtrex at this time to see if we can hasten healing.  If she has recurrent lesions she may need to be on suppression.    Sj Samuel MD  [facility] [place]

## 2019-12-03 NOTE — TELEPHONE ENCOUNTER
Patient called and pharmacy had not received the rx.  Had questions why has had not been prescribed daily use.  Explained that daily dose is usually for Chronic outbreaks and with this being her first outbreak would start with the 7 day course.  Also no intercourse during outbreaks.  Will call in rx to pharmacy. Pt verbalized understanding, in agreement with plan, and voiced no further questions.  Pharmacy called and verbal order relayed.   Susanne Fuentes RN on 12/3/2019 at 4:13 PM

## 2019-12-04 LAB
BACTERIA SPEC CULT: NO GROWTH
HSV1 IGG SERPL QL IA: >8 AI (ref 0–0.8)
HSV2 IGG SERPL QL IA: <0.2 AI (ref 0–0.8)
Lab: NORMAL
SPECIMEN SOURCE: NORMAL
T PALLIDUM AB SER QL: NONREACTIVE

## 2019-12-05 LAB
HBV SURFACE AG SERPL QL IA: NONREACTIVE
HCV AB SERPL QL IA: NONREACTIVE
HIV 1+2 AB+HIV1 P24 AG SERPL QL IA: NONREACTIVE

## 2019-12-06 ENCOUNTER — MYC MEDICAL ADVICE (OUTPATIENT)
Dept: OBGYN | Facility: CLINIC | Age: 44
End: 2019-12-06

## 2019-12-12 ENCOUNTER — TELEPHONE (OUTPATIENT)
Dept: OBGYN | Facility: CLINIC | Age: 44
End: 2019-12-12

## 2019-12-12 DIAGNOSIS — A60.04 HERPES SIMPLEX VULVOVAGINITIS: Primary | ICD-10-CM

## 2019-12-12 RX ORDER — ACYCLOVIR 400 MG/1
400 TABLET ORAL EVERY 12 HOURS
Qty: 14 TABLET | Refills: 1 | Status: SHIPPED | OUTPATIENT
Start: 2019-12-12 | End: 2020-10-05

## 2019-12-12 NOTE — TELEPHONE ENCOUNTER
Valcyclovir not covered under insurance. Will cover acyclovir. Pharmacy pended-takes for outbreaks not daily supression  Nancy Sears RN on 12/12/2019 at 11:47 AM

## 2020-09-28 ENCOUNTER — TELEPHONE (OUTPATIENT)
Dept: OBGYN | Facility: CLINIC | Age: 45
End: 2020-09-28

## 2020-09-28 NOTE — TELEPHONE ENCOUNTER
Question about her Valacyclovir medication.  How to take with possible outbreak  500 mg BID for 3 days  Pt has a Labial bump she thought was possible herpes outbreak. She has only had one/initial outbreak in past.    Instructed taking valtrex 500 mg BID x3d  Warm tub soaks as well  If no improvement on labial bump - be seen in office for evaluation.    Pt verbalized understanding, in agreement with plan, and voiced no further questions.  Carol Calixto RN on 9/28/2020 at 9:55 AM

## 2020-10-01 ENCOUNTER — TELEPHONE (OUTPATIENT)
Dept: OBGYN | Facility: CLINIC | Age: 45
End: 2020-10-01

## 2020-10-01 NOTE — TELEPHONE ENCOUNTER
Patient having HSV outbreak, wondering if she can increase her medication?  She is scheduled for appt. With Dr. Samuel on 10/5.

## 2020-10-02 NOTE — TELEPHONE ENCOUNTER
She should take her valacyclovir 500mg 1 tablet twice daily during an outbreak, typically for 5 days, longer is okay too if she needs it.

## 2020-10-02 NOTE — TELEPHONE ENCOUNTER
Ok for pt to increase from 500 mg daily to 1000 mg daily during outbreak?  Nancy Sears RN on 10/2/2020 at 8:59 AM

## 2020-10-05 ENCOUNTER — OFFICE VISIT (OUTPATIENT)
Dept: OBGYN | Facility: CLINIC | Age: 45
End: 2020-10-05
Payer: COMMERCIAL

## 2020-10-05 VITALS
DIASTOLIC BLOOD PRESSURE: 80 MMHG | HEIGHT: 65 IN | SYSTOLIC BLOOD PRESSURE: 154 MMHG | WEIGHT: 159 LBS | BODY MASS INDEX: 26.49 KG/M2

## 2020-10-05 DIAGNOSIS — A60.04 HERPES SIMPLEX VULVOVAGINITIS: ICD-10-CM

## 2020-10-05 DIAGNOSIS — Z12.4 SCREENING FOR CERVICAL CANCER: Primary | ICD-10-CM

## 2020-10-05 PROCEDURE — 87624 HPV HI-RISK TYP POOLED RSLT: CPT | Performed by: OBSTETRICS & GYNECOLOGY

## 2020-10-05 PROCEDURE — G0145 SCR C/V CYTO,THINLAYER,RESCR: HCPCS | Performed by: OBSTETRICS & GYNECOLOGY

## 2020-10-05 PROCEDURE — 99212 OFFICE O/P EST SF 10 MIN: CPT | Performed by: OBSTETRICS & GYNECOLOGY

## 2020-10-05 ASSESSMENT — MIFFLIN-ST. JEOR: SCORE: 1364.16

## 2020-10-05 NOTE — PROGRESS NOTES
SUBJECTIVE:                                                   Claire Khan is a 45 year old female who presents to clinic today for the following health issue(s):  Patient presents with:  Follow Up: Pt has been experiencing an outbreak and thought it may be an HSV outbreak. Took Valtrex as prescribed. She experienced painful IC.      HPI: The patient has a history of genital herpes.  Approximately 2 weeks ago she had some tingling and an outbreak.  She took 2 Valtrex a day for only 3 days but the lesions have resolved.  She is still feeling some nerve tingling.  She also notes 1 small cyst that is a sebaceous cyst on the right.      Patient's last menstrual period was 11/29/2018..     Patient is sexually active, No obstetric history on file..  Using condoms and hysterectomy for contraception.    reports that she has never smoked. She has never used smokeless tobacco.    STD testing offered?  Declined    Health maintenance updated:  yes    Today's PHQ-2 Score:   PHQ-2 ( 1999 Pfizer) 12/3/2018   Q1: Little interest or pleasure in doing things 1   Q2: Feeling down, depressed or hopeless 1   PHQ-2 Score 2     Today's PHQ-9 Score: No flowsheet data found.  Today's PINA-7 Score: No flowsheet data found.    Problem list and histories reviewed & adjusted, as indicated.  Additional history: as documented.    Patient Active Problem List   Diagnosis     Myoma     Past Surgical History:   Procedure Laterality Date     ARTHROSCOPY KNEE WITH PATELLAR REALIGNMENT  6/29/2012    Procedure: ARTHROSCOPY KNEE WITH PATELLAR REALIGNMENT;  Right Knee Arthroscopy, Right Medial Patellar Femoral Ligament  Reconstruction with Graft, Partial Lateral Facetectomy, Lateral Retinacular Lengthening  ;  Surgeon: Rain Noe MD;  [facility]     ARTHROSCOPY KNEE WITH RETINACULAR RELEASE  5/30/2014    Procedure: ARTHROSCOPY KNEE WITH RETINACULAR RELEASE MEDIAL OR LATERAL;  Surgeon: Rain Noe MD;  [facility]      EXTERNAL EAR SURGERY       HYSTEROSCOPY, ABLATE ENDOMETRIUM VERSAPOINT , COMBINED N/A 4/6/2017    Procedure: COMBINED HYSTEROSCOPY, ABLATE ENDOMETRIUM VERSAPOINT;  Surgeon: Sj Samuel MD;  Location: [facility]     LAPAROSCOPIC HYSTERECTOMY SUPRACERVICAL N/A 12/11/2018    Procedure: LAPAROSCOPIC HYSTERECTOMY SUPRACERVICAL, BILATERAL SALPINGECTOMY;  Surgeon: Sj Samuel MD;  Location: [facility]     LAPAROSCOPIC SALPINGECTOMY Bilateral 12/11/2018    Procedure: LAPAROSCOPIC BILATERAL SALPINGECTOMY;  Surgeon: Sj Samuel MD;  Location: [facility]     PARTIAL HYMENECTOMY/REVISION HYMENAL RING       WISDOM TEETH[        Social History     Tobacco Use     Smoking status: Never Smoker     Smokeless tobacco: Never Used   Substance Use Topics     Alcohol use: Yes     Comment: 1 DRINK PER MONTH      Problem (# of Occurrences) Relation (Name,Age of Onset)    C.A.D. (1) Mother    Diabetes (1) Father            Current Outpatient Medications   Medication Sig     aspirin 325 MG tablet Take 325 mg by mouth daily     medical cannabis (Patient's own supply.  Not a prescription) Take  as instructed. take 1 tab by mouth once daily if needed.     Multiple Vitamins-Minerals (MULTIVITAMIN ADULT PO) Take 1 tablet by mouth daily      Sertraline HCl (ZOLOFT PO) Take 100 mg by mouth daily      TRAMADOL HCL PO Take by mouth as needed      valACYclovir (VALTREX) 500 MG tablet Take 1 tablet (500 mg) by mouth daily     ALPRAZolam (XANAX) 1 MG tablet Take 1/2 Tablet by mouth 2 times Daily as needed.     baclofen (LIORESAL) 10 MG tablet TAKE ONE-half TABLET (10 MG) BY MOUTH 2 times DAILY if NEEDED FOR MUSCLE SPASMS     COMPOUNDED NON-CONTROLLED SUBSTANCE (CMPD RX) - PHARMACY TO MIX COMPOUNDED MEDICATION Slidenafil 3%, Nitroglycerin 0.2% paraben-free ointment base-hypoallergenic. Apply a small amount of ointment directly to clitoris 30 minutes prior to sexual activity. (Patient not taking: Reported on 10/5/2020)     OXYBUTYNIN CHLORIDE ER PO   "    No current facility-administered medications for this visit.      Allergies   Allergen Reactions     Bactrim Other (See Comments)     Blisters or  sores on tongue     Sulfa Drugs Unknown and Other (See Comments)     Mouth Sores  Mouth Sores     Paroxetine Anxiety     Panic attack  Other reaction(s): Tachycardia     Vicodin [Hydrocodone-Acetaminophen] Anxiety     anxiety       ROS:  12 point review of systems negative other than symptoms noted below or in the HPI.  Genitourinary: vaginal lumps  No urinary frequency or dysuria, bladder or kidney problems      OBJECTIVE:     BP (!) 154/80   Ht 1.638 m (5' 4.5\")   Wt 72.1 kg (159 lb)   LMP 11/29/2018   Breastfeeding No   BMI 26.87 kg/m    Body mass index is 26.87 kg/m .    Exam:  Constitutional:  Appearance: Well nourished, well developed alert, in no acute distress  Lymphatic: Lymph Nodes:  No other lymphadenopathy present  Skin: General Inspection:  No rashes present, no lesions present, no areas of discoloration.  Neurologic:  Mental Status:  Oriented X3.  Normal strength and tone, sensory exam grossly normal, mentation intact and speech normal.    Psychiatric:  Mentation appears normal and affect normal/bright.  Pelvic Exam:  External Genitalia:     Normal appearance for age, no discharge present, no tenderness present, no inflammatory lesions present, color normal  Vagina:     Normal vaginal vault without central or paravaginal defects, no discharge present, no inflammatory lesions present, no masses present  Bladder:     Nontender to palpation  Urethra:   Urethral Body:  Urethra palpation normal, urethra structural support normal   Urethral Meatus:  No erythema or lesions present  Cervix:     Appearance healthy, no lesions present, nontender to palpation, no bleeding present  Uterus:     Uterus: firm, normal sized and nontender, midplane in position.   Adnexa:     No adnexal tenderness present, no adnexal masses present  Perineum:     Perineum within " normal limits, no evidence of trauma, no rashes or skin lesions present  Anus:     Anus within normal limits, no hemorrhoids present  Inguinal Lymph Nodes:     No lymphadenopathy present  Pubic Hair:     Normal pubic hair distribution for age  Genitalia and Groin:     No rashes present, no lesions present, no areas of discoloration, no masses present       In-Clinic Test Results:      ASSESSMENT/PLAN:                                                        Probable recurrent herpes 2 weeks ago.  She will go back to her daily suppression.  Pap smear is pending at this time as it was overdue.        Sj Samuel MD  [facility] [place]

## 2020-10-08 LAB
COPATH REPORT: NORMAL
PAP: NORMAL

## 2020-11-02 ENCOUNTER — ANCILLARY PROCEDURE (OUTPATIENT)
Dept: MAMMOGRAPHY | Facility: CLINIC | Age: 45
End: 2020-11-02
Payer: COMMERCIAL

## 2020-11-02 DIAGNOSIS — Z12.31 VISIT FOR SCREENING MAMMOGRAM: ICD-10-CM

## 2020-11-02 PROCEDURE — 77067 SCR MAMMO BI INCL CAD: CPT | Performed by: RADIOLOGY

## 2020-12-25 DIAGNOSIS — A60.04 HERPES SIMPLEX VULVOVAGINITIS: ICD-10-CM

## 2020-12-28 RX ORDER — VALACYCLOVIR HYDROCHLORIDE 500 MG/1
TABLET, FILM COATED ORAL
Qty: 30 TABLET | Refills: 0 | Status: SHIPPED | OUTPATIENT
Start: 2020-12-28 | End: 2021-04-27

## 2020-12-28 NOTE — TELEPHONE ENCOUNTER
"Requested Prescriptions   Pending Prescriptions Disp Refills     valACYclovir (VALTREX) 500 MG tablet [Pharmacy Med Name: VALACYCLOVIR 500MG TABLETS] 90 tablet 0     Sig: TAKE 1 TABLET(500 MG) BY MOUTH DAILY       Antivirals for Herpes Protocol Failed - 12/25/2020  3:10 AM        Failed - Normal serum creatinine on file in past 12 months     No lab results found.    Ok to refill medication if creatinine is low          Passed - Patient is age 12 or older        Passed - Recent (12 mo) or future (30 days) visit within the authorizing provider's specialty     Patient has had an office visit with the authorizing provider or a provider within the authorizing providers department within the previous 12 mos or has a future within next 30 days. See \"Patient Info\" tab in inbasket, or \"Choose Columns\" in Meds & Orders section of the refill encounter.              Passed - Medication is active on med list           Medication is being filled for 1 time refill only due to:  appointment needed Nancy Sears RN on 12/28/2020 at 10:28 AM      "

## 2021-01-15 ENCOUNTER — HEALTH MAINTENANCE LETTER (OUTPATIENT)
Age: 46
End: 2021-01-15

## 2021-04-27 ENCOUNTER — OFFICE VISIT (OUTPATIENT)
Dept: OBGYN | Facility: CLINIC | Age: 46
End: 2021-04-27
Payer: COMMERCIAL

## 2021-04-27 VITALS
HEIGHT: 65 IN | HEART RATE: 84 BPM | BODY MASS INDEX: 26.66 KG/M2 | WEIGHT: 160 LBS | DIASTOLIC BLOOD PRESSURE: 74 MMHG | SYSTOLIC BLOOD PRESSURE: 136 MMHG

## 2021-04-27 DIAGNOSIS — L72.3 SEBACEOUS CYST: ICD-10-CM

## 2021-04-27 DIAGNOSIS — A60.04 HERPES SIMPLEX VULVOVAGINITIS: ICD-10-CM

## 2021-04-27 PROCEDURE — 99213 OFFICE O/P EST LOW 20 MIN: CPT | Performed by: OBSTETRICS & GYNECOLOGY

## 2021-04-27 RX ORDER — VALACYCLOVIR HYDROCHLORIDE 1 G/1
TABLET, FILM COATED ORAL
Qty: 14 TABLET | Refills: 3 | OUTPATIENT
Start: 2021-04-27

## 2021-04-27 RX ORDER — VALACYCLOVIR HYDROCHLORIDE 500 MG/1
500 TABLET, FILM COATED ORAL DAILY
Qty: 90 TABLET | Refills: 3 | Status: SHIPPED | OUTPATIENT
Start: 2021-04-27 | End: 2021-11-08

## 2021-04-27 ASSESSMENT — MIFFLIN-ST. JEOR: SCORE: 1363.7

## 2021-04-27 NOTE — PROGRESS NOTES
SUBJECTIVE:                                                   Claire Khan is a 46 year old female who presents to clinic today for the following health issue(s):  Patient presents with:  Vaginal Problem: has a new vaginal lump, has same symptoms as does with herpes outbreak, but looks different.      HPI: The patient is seen at this time for a new sharp and painful area medial to the left labia majora.  She has a past history of herpes and has been off Valtrex for the last 6 months.  She has had no lesions.  She does have some tingling in this area but denies any other symptoms.      Patient's last menstrual period was 11/29/2018..     Patient is sexually active, No obstetric history on file..  Using hysterectomy for contraception.    reports that she has never smoked. She has never used smokeless tobacco.    STD testing offered?  Declined    Health maintenance updated:  yes    Today's PHQ-2 Score:   PHQ-2 ( 1999 Pfizer) 4/27/2021   Q1: Little interest or pleasure in doing things 1   Q2: Feeling down, depressed or hopeless 1   PHQ-2 Score 2     Today's PHQ-9 Score: No flowsheet data found.  Today's PINA-7 Score: No flowsheet data found.    Problem list and histories reviewed & adjusted, as indicated.  Additional history: as documented.    Patient Active Problem List   Diagnosis     Myoma     Past Surgical History:   Procedure Laterality Date     ARTHROSCOPY KNEE WITH PATELLAR REALIGNMENT  6/29/2012    Procedure: ARTHROSCOPY KNEE WITH PATELLAR REALIGNMENT;  Right Knee Arthroscopy, Right Medial Patellar Femoral Ligament  Reconstruction with Graft, Partial Lateral Facetectomy, Lateral Retinacular Lengthening  ;  Surgeon: Rain Noe MD;  [facility]     ARTHROSCOPY KNEE WITH RETINACULAR RELEASE  5/30/2014    Procedure: ARTHROSCOPY KNEE WITH RETINACULAR RELEASE MEDIAL OR LATERAL;  Surgeon: Rain Noe MD;  [facility]     EXTERNAL EAR SURGERY       HYSTEROSCOPY, ABLATE ENDOMETRIUM  VERSAPOINT , COMBINED N/A 4/6/2017    Procedure: COMBINED HYSTEROSCOPY, ABLATE ENDOMETRIUM VERSAPOINT;  Surgeon: Sj Samuel MD;  Location: [facility]     LAPAROSCOPIC HYSTERECTOMY SUPRACERVICAL N/A 12/11/2018    Procedure: LAPAROSCOPIC HYSTERECTOMY SUPRACERVICAL, BILATERAL SALPINGECTOMY;  Surgeon: Sj Samuel MD;  Location: [facility]     LAPAROSCOPIC SALPINGECTOMY Bilateral 12/11/2018    Procedure: LAPAROSCOPIC BILATERAL SALPINGECTOMY;  Surgeon: Sj Samuel MD;  Location: [facility]     PARTIAL HYMENECTOMY/REVISION HYMENAL RING       WISDOM TEETH[        Social History     Tobacco Use     Smoking status: Never Smoker     Smokeless tobacco: Never Used   Substance Use Topics     Alcohol use: Yes     Comment: 1 DRINK PER MONTH      Problem (# of Occurrences) Relation (Name,Age of Onset)    C.A.D. (1) Mother    Diabetes (1) Father            Current Outpatient Medications   Medication Sig     ALPRAZolam (XANAX) 1 MG tablet Take 1/2 Tablet by mouth 2 times Daily as needed.     aspirin 325 MG tablet Take 325 mg by mouth daily     baclofen (LIORESAL) 10 MG tablet TAKE ONE-half TABLET (10 MG) BY MOUTH 2 times DAILY if NEEDED FOR MUSCLE SPASMS     COMPOUNDED NON-CONTROLLED SUBSTANCE (CMPD RX) - PHARMACY TO MIX COMPOUNDED MEDICATION Slidenafil 3%, Nitroglycerin 0.2% paraben-free ointment base-hypoallergenic. Apply a small amount of ointment directly to clitoris 30 minutes prior to sexual activity.     medical cannabis (Patient's own supply.  Not a prescription) Take  as instructed. take 1 tab by mouth once daily if needed.     Multiple Vitamins-Minerals (MULTIVITAMIN ADULT PO) Take 1 tablet by mouth daily      Sertraline HCl (ZOLOFT PO) Take 100 mg by mouth daily      TRAMADOL HCL PO Take by mouth as needed      valACYclovir (VALTREX) 500 MG tablet Take 1 tablet (500 mg) by mouth daily     No current facility-administered medications for this visit.      Allergies   Allergen Reactions     Bactrim Other (See Comments)      "Blisters or  sores on tongue     Sulfa Drugs Unknown and Other (See Comments)     Mouth Sores  Mouth Sores     Paroxetine Anxiety     Panic attack  Other reaction(s): Tachycardia     Vicodin [Hydrocodone-Acetaminophen] Anxiety     anxiety       ROS:  12 point review of systems negative other than symptoms noted below or in the HPI.  No urinary frequency or dysuria, bladder or kidney problems      OBJECTIVE:     /74   Pulse 84   Ht 1.638 m (5' 4.5\")   Wt 72.6 kg (160 lb)   LMP 11/29/2018   BMI 27.04 kg/m    Body mass index is 27.04 kg/m .    Exam:  Constitutional:  Appearance: Well nourished, well developed alert, in no acute distress  Lymphatic: Lymph Nodes:  No other lymphadenopathy present  Skin: General Inspection:  No rashes present, no lesions present, no areas of discoloration.  Neurologic:  Mental Status:  Oriented X3.  Normal strength and tone, sensory exam grossly normal, mentation intact and speech normal.    Psychiatric:  Mentation appears normal and affect normal/bright.  Pelvic Exam:  External Genitalia: 6 mm tender but not infected sebaceous cyst at 3:00 on the vulva.     Vagina:     Normal vaginal vault without central or paravaginal defects, no discharge present, no inflammatory lesions present, no masses present  Bladder:     Nontender to palpation  Urethra:   Urethral Body:  Urethra palpation normal, urethra structural support normal   Urethral Meatus:  No erythema or lesions present  Cervix:     Appearance healthy, no lesions present, nontender to palpation, no bleeding present  Uterus:     Uterus: firm, normal sized and nontender, midplane in position.   Adnexa:     No adnexal tenderness present, no adnexal masses present  Perineum:     Perineum within normal limits, no evidence of trauma, no rashes or skin lesions present  Anus:     Anus within normal limits, no hemorrhoids present  Inguinal Lymph Nodes:     No lymphadenopathy present  Pubic Hair:     Normal pubic hair distribution " for age  Genitalia and Groin:     No rashes present, no lesions present, no areas of discoloration, no masses present       In-Clinic Test Results:      ASSESSMENT/PLAN:                                                        ICD-10-CM    1. Sebaceous cyst  L72.3 valACYclovir (VALTREX) 500 MG tablet   The patient's physical findings do not line up with a herpetic outbreak.  She has a small sebaceous cyst that I believe she has manipulated to the point of pain.  We have asked her to sit in the tub and soak.  If this comes to a point I will be surprised.  She will return if things are worse.    Sj Samuel MD  [facility] [place]

## 2021-04-27 NOTE — TELEPHONE ENCOUNTER
"Requested Prescriptions   Pending Prescriptions Disp Refills     valACYclovir (VALTREX) 1000 mg tablet [Pharmacy Med Name: VALACYCLOVIR 1GM TABLETS] 14 tablet 3     Sig: TAKE 1 TABLET(1000 MG) BY MOUTH TWICE DAILY FOR 7 DAYS       Antivirals for Herpes Protocol Failed - 4/27/2021 10:11 AM        Failed - Normal serum creatinine on file in past 12 months     No lab results found.    Ok to refill medication if creatinine is low          Passed - Patient is age 12 or older        Passed - Recent (12 mo) or future (30 days) visit within the authorizing provider's specialty     Patient has had an office visit with the authorizing provider or a provider within the authorizing providers department within the previous 12 mos or has a future within next 30 days. See \"Patient Info\" tab in inbasket, or \"Choose Columns\" in Meds & Orders section of the refill encounter.              Passed - Medication is active on med list           Office visit today   Nancy Sears RN on 4/27/2021 at 2:08 PM      "

## 2021-05-03 ENCOUNTER — TELEPHONE (OUTPATIENT)
Dept: OBGYN | Facility: CLINIC | Age: 46
End: 2021-05-03

## 2021-05-03 NOTE — TELEPHONE ENCOUNTER
"Cyst on L side  Has gone down a lot, still a little tender but much improved.    Still has intense tingling, feels very much like prior to an HSV outbreak  Had intercourse last night with partner using condom, and it was very painful - could feel a \"bump\" inside her vaginal canal.  This is new to her.  Is asking if she should take her Valtrex for the next week and see if it resolves.    Since this is new, I recommended making an OV to have it evaluated, but ultimately up to her if she wants to wait it out to see if it improves.    Pt verbalized understanding, in agreement with plan, and voiced no further questions.    Edwina Brooks RN on 5/3/2021 at 4:02 PM    "

## 2021-05-03 NOTE — TELEPHONE ENCOUNTER
Patient is calling in regards to her vaginal cyst.  She has been having tingling sensation.  Patient is taking medication but it doesn't seem to be helping.  She is unsure if this has to do with the cyst and sex is very painful.

## 2021-08-23 ENCOUNTER — TELEPHONE (OUTPATIENT)
Dept: OBGYN | Facility: CLINIC | Age: 46
End: 2021-08-23

## 2021-08-23 NOTE — TELEPHONE ENCOUNTER
Called pt back     Pt believes she is having an outbreak.  Reviewed notes from Phone Encounter 10/1/2020:  She should take her valacyclovir 500mg 1 tablet twice daily during an outbreak, typically for 5 days, longer is okay too if she needs it.     Informed pt of recommendation.  If does not resolve to schedule an OV for evaluation.  Pt asked about Rx, doesn't want to run out.  Informed pt that refills can be sent thru pharmacy, as long as up to date on annual exams, will be ok to refill.    Next annual due 10/2021, recommended pt schedule now as appts are limited.      Pt verbalized understanding, in agreement with plan, and voiced no further questions.  Edwina Brooks RN on 8/23/2021 at 1:50 PM

## 2021-08-23 NOTE — TELEPHONE ENCOUNTER
Patient is calling because she has a labial sore and is not sure if she should increase her Acyclovir or what she should do? Please call patient back today.

## 2021-09-04 ENCOUNTER — HEALTH MAINTENANCE LETTER (OUTPATIENT)
Age: 46
End: 2021-09-04

## 2021-11-05 NOTE — PROGRESS NOTES
Claire is a 46 year old No obstetric history on file. female who presents for annual exam.     Besides routine health maintenance, {NO OTHER:957411}.    HPI:  The patient's PCP is *** ANAHI Yung.  ***      GYNECOLOGIC HISTORY:    Patient's last menstrual period was 11/29/2018.    Her current contraception method is: hysterectomy.  She  reports that she has never smoked. She has never used smokeless tobacco.    Patient {IS/IS NOT:9024} sexually active.  STD testing offered?  {GC/CHLAMYDIA:235839}     Last PHQ-9 score on record = No flowsheet data found.  Last GAD7 score on record = No flowsheet data found.  Alcohol Score = ***    HEALTH MAINTENANCE:  Cholesterol: 10/03/20   TPZL=906, TRIGLYCERIDES=92, HDL=54, FKF=655.  Last Mammo: One year ago, Result: Normal, Next Mammo: 11/22/21   Pap:   Lab Results   Component Value Date    PAP NIL NEG-HPV 10/05/2020    PAP NIL 10/01/2019     Colonoscopy: N/A, Next Colonoscopy: Due at age 51.  Dexa:  N/A    Health maintenance updated:  yes    HISTORY:  OB History   No obstetric history on file.       Patient Active Problem List   Diagnosis     Myoma     Past Surgical History:   Procedure Laterality Date     ARTHROSCOPY KNEE WITH PATELLAR REALIGNMENT  6/29/2012    Procedure: ARTHROSCOPY KNEE WITH PATELLAR REALIGNMENT;  Right Knee Arthroscopy, Right Medial Patellar Femoral Ligament  Reconstruction with Graft, Partial Lateral Facetectomy, Lateral Retinacular Lengthening  ;  Surgeon: Rain Noe MD;  Location:  OR     ARTHROSCOPY KNEE WITH RETINACULAR RELEASE  5/30/2014    Procedure: ARTHROSCOPY KNEE WITH RETINACULAR RELEASE MEDIAL OR LATERAL;  Surgeon: Rain Noe MD;  Location:  OR     EXTERNAL EAR SURGERY       HYSTEROSCOPY, ABLATE ENDOMETRIUM VERSAPOINT , COMBINED N/A 4/6/2017    Procedure: COMBINED HYSTEROSCOPY, ABLATE ENDOMETRIUM VERSAPOINT;  Surgeon: Sj Samuel MD;  Location: [facility]     LAPAROSCOPIC HYSTERECTOMY SUPRACERVICAL N/A  12/11/2018    Procedure: LAPAROSCOPIC HYSTERECTOMY SUPRACERVICAL, BILATERAL SALPINGECTOMY;  Surgeon: Sj Samuel MD;  Location: [facility]     LAPAROSCOPIC SALPINGECTOMY Bilateral 12/11/2018    Procedure: LAPAROSCOPIC BILATERAL SALPINGECTOMY;  Surgeon: Sj Samuel MD;  Location: [facility]     PARTIAL HYMENECTOMY/REVISION HYMENAL RING       WISDOM TEETH[        Social History     Tobacco Use     Smoking status: Never Smoker     Smokeless tobacco: Never Used   Substance Use Topics     Alcohol use: Yes     Comment: 1 DRINK PER MONTH      Problem (# of Occurrences) Relation (Name,Age of Onset)    C.A.D. (1) Mother    Diabetes (1) Father            Current Outpatient Medications   Medication Sig     ALPRAZolam (XANAX) 1 MG tablet Take 1/2 Tablet by mouth 2 times Daily as needed.     aspirin 325 MG tablet Take 325 mg by mouth daily     baclofen (LIORESAL) 10 MG tablet TAKE ONE-half TABLET (10 MG) BY MOUTH 2 times DAILY if NEEDED FOR MUSCLE SPASMS     COMPOUNDED NON-CONTROLLED SUBSTANCE (CMPD RX) - PHARMACY TO MIX COMPOUNDED MEDICATION Slidenafil 3%, Nitroglycerin 0.2% paraben-free ointment base-hypoallergenic. Apply a small amount of ointment directly to clitoris 30 minutes prior to sexual activity.     medical cannabis (Patient's own supply.  Not a prescription) Take  as instructed. take 1 tab by mouth once daily if needed.     Multiple Vitamins-Minerals (MULTIVITAMIN ADULT PO) Take 1 tablet by mouth daily      Sertraline HCl (ZOLOFT PO) Take 100 mg by mouth daily      TRAMADOL HCL PO Take by mouth as needed      valACYclovir (VALTREX) 500 MG tablet Take 1 tablet (500 mg) by mouth daily     No current facility-administered medications for this visit.     Allergies   Allergen Reactions     Bactrim Other (See Comments)     Blisters or  sores on tongue     Sulfa Drugs Unknown and Other (See Comments)     Mouth Sores  Mouth Sores     Paroxetine Anxiety     Panic attack  Other reaction(s): Tachycardia     Vicodin  "[Hydrocodone-Acetaminophen] Anxiety     anxiety       Past medical, surgical, social and family histories were reviewed and updated in EPIC.    ROS:   {[facility] ROSGYN:648191::\"12 point review of systems negative other than symptoms noted below or in the HPI.\"}  {ROS - :865951::\"No urinary frequency or dysuria, bladder or kidney problems\"}    EXAM:  LMP 11/29/2018    BMI: There is no height or weight on file to calculate BMI.    PHYSICAL EXAM:  Constitutional:   Appearance: Well nourished, well developed, alert, in no acute distress  Neck:  Lymph Nodes:  No lymphadenopathy present    Thyroid:  Gland size normal, nontender, no nodules or masses present  on palpation  Chest:  Respiratory Effort:  Breathing unlabored  Cardiovascular:    Heart: Auscultation:  Regular rate, normal rhythm, no murmurs present  Breasts: {[facility] Breast exam:532944}  Gastrointestinal:   Abdominal Examination:  Abdomen nontender to palpation, tone normal without rigidity or guarding, no masses present, umbilicus without lesions   Liver and Spleen:  No hepatomegaly present, liver nontender to palpation    Hernias:  No hernias present  Lymphatic: Lymph Nodes:  No other lymphadenopathy present  Skin:  General Inspection:  No rashes present, no lesions present, no areas of  discoloration  Neurologic:    Mental Status:  Oriented X3.  Normal strength and tone, sensory exam                grossly normal, mentation intact and speech normal.    Psychiatric:   Mentation appears normal and affect normal/bright.         {[facility] Pelvic Exam:776871}    COUNSELING:   {FEMALE COUNSELING MESSAGES:187636::\"Reviewed preventive health counseling, as reflected in patient instructions\"}    BMI: There is no height or weight on file to calculate BMI.  {Obesity Action Plan -- Delete if BMA < 25:247829}    ASSESSMENT:  46 year old female with satisfactory annual exam.  No diagnosis found.    PLAN:  ***    Jennifer Castillo, JACQUELINE CNP  "

## 2021-11-08 ENCOUNTER — OFFICE VISIT (OUTPATIENT)
Dept: OBGYN | Facility: CLINIC | Age: 46
End: 2021-11-08
Payer: COMMERCIAL

## 2021-11-08 VITALS
DIASTOLIC BLOOD PRESSURE: 80 MMHG | WEIGHT: 157 LBS | BODY MASS INDEX: 26.8 KG/M2 | HEIGHT: 64 IN | SYSTOLIC BLOOD PRESSURE: 136 MMHG

## 2021-11-08 DIAGNOSIS — L72.3 SEBACEOUS CYST: ICD-10-CM

## 2021-11-08 DIAGNOSIS — Z12.4 SCREENING FOR CERVICAL CANCER: Primary | ICD-10-CM

## 2021-11-08 PROCEDURE — G0145 SCR C/V CYTO,THINLAYER,RESCR: HCPCS | Performed by: NURSE PRACTITIONER

## 2021-11-08 PROCEDURE — 99213 OFFICE O/P EST LOW 20 MIN: CPT | Performed by: NURSE PRACTITIONER

## 2021-11-08 PROCEDURE — 87624 HPV HI-RISK TYP POOLED RSLT: CPT | Performed by: NURSE PRACTITIONER

## 2021-11-08 RX ORDER — VALACYCLOVIR HYDROCHLORIDE 500 MG/1
500 TABLET, FILM COATED ORAL DAILY
Qty: 90 TABLET | Refills: 3 | Status: SHIPPED | OUTPATIENT
Start: 2021-11-08

## 2021-11-08 ASSESSMENT — ANXIETY QUESTIONNAIRES
IF YOU CHECKED OFF ANY PROBLEMS ON THIS QUESTIONNAIRE, HOW DIFFICULT HAVE THESE PROBLEMS MADE IT FOR YOU TO DO YOUR WORK, TAKE CARE OF THINGS AT HOME, OR GET ALONG WITH OTHER PEOPLE: VERY DIFFICULT
GAD7 TOTAL SCORE: 14
1. FEELING NERVOUS, ANXIOUS, OR ON EDGE: MORE THAN HALF THE DAYS
3. WORRYING TOO MUCH ABOUT DIFFERENT THINGS: MORE THAN HALF THE DAYS
7. FEELING AFRAID AS IF SOMETHING AWFUL MIGHT HAPPEN: MORE THAN HALF THE DAYS
6. BECOMING EASILY ANNOYED OR IRRITABLE: MORE THAN HALF THE DAYS
2. NOT BEING ABLE TO STOP OR CONTROL WORRYING: MORE THAN HALF THE DAYS
5. BEING SO RESTLESS THAT IT IS HARD TO SIT STILL: MORE THAN HALF THE DAYS

## 2021-11-08 ASSESSMENT — MIFFLIN-ST. JEOR: SCORE: 1334.21

## 2021-11-08 ASSESSMENT — PATIENT HEALTH QUESTIONNAIRE - PHQ9: 5. POOR APPETITE OR OVEREATING: MORE THAN HALF THE DAYS

## 2021-11-08 NOTE — PROGRESS NOTES
SUBJECTIVE:                                                   Claire Khan is a 46 year old female who presents to clinic today for the following health issue(s):  Patient presents with:  Gyn Exam: pap smear  Breast Exam    HPI:  Here for breast and pelvic exam.  She has her annual with her PCP. All blood work with PCP.  She states she had covid 3 weeks ago, her whole family, step mom was here and didn't tell anyone was not feeling well.  Her and her sister have factor V, so have not had vaccines, they went had the IV antibody treatment and did well.  She said everyone in her family had it but was milder.  Although she has noticed since having covid her herpes outbreaks are frequent.  She is taking valtrex 500mg bid.  She had a LASH in 2018 with Dr. Samuel.      Patient's last menstrual period was 11/29/2018.    Patient is sexually active, No obstetric history on file.  Using hysterectomy for contraception.    reports that she has never smoked. She has never used smokeless tobacco.    STD testing offered?  Declined    Health maintenance updated:  yes    Today's PHQ-2 Score:   PHQ-2 ( 1999 Pfizer) 11/8/2021   Q1: Little interest or pleasure in doing things 1   Q2: Feeling down, depressed or hopeless 1   PHQ-2 Score 2     Today's PHQ-9 Score:   PHQ-9 SCORE 11/8/2021   PHQ-9 Total Score 5     Today's PINA-7 Score:   PINA-7 SCORE 11/8/2021   Total Score 14       Problem list and histories reviewed & adjusted, as indicated.  Additional history: as documented.    Patient Active Problem List   Diagnosis     Myoma     Past Surgical History:   Procedure Laterality Date     ARTHROSCOPY KNEE WITH PATELLAR REALIGNMENT  6/29/2012    Procedure: ARTHROSCOPY KNEE WITH PATELLAR REALIGNMENT;  Right Knee Arthroscopy, Right Medial Patellar Femoral Ligament  Reconstruction with Graft, Partial Lateral Facetectomy, Lateral Retinacular Lengthening  ;  Surgeon: Rain Noe MD;  [facility]     ARTHROSCOPY KNEE WITH  RETINACULAR RELEASE  5/30/2014    Procedure: ARTHROSCOPY KNEE WITH RETINACULAR RELEASE MEDIAL OR LATERAL;  Surgeon: Rain Noe MD;  [facility]     EXTERNAL EAR SURGERY       HYSTEROSCOPY, ABLATE ENDOMETRIUM VERSAPOINT , COMBINED N/A 4/6/2017    Procedure: COMBINED HYSTEROSCOPY, ABLATE ENDOMETRIUM VERSAPOINT;  Surgeon: Sj Samuel MD;  Location: [facility]     LAPAROSCOPIC HYSTERECTOMY SUPRACERVICAL N/A 12/11/2018    Procedure: LAPAROSCOPIC HYSTERECTOMY SUPRACERVICAL, BILATERAL SALPINGECTOMY;  Surgeon: Sj Samuel MD;  Location: [facility]     LAPAROSCOPIC SALPINGECTOMY Bilateral 12/11/2018    Procedure: LAPAROSCOPIC BILATERAL SALPINGECTOMY;  Surgeon: Sj Samuel MD;  Location: [facility]     PARTIAL HYMENECTOMY/REVISION HYMENAL RING       WISDOM TEETH[        Social History     Tobacco Use     Smoking status: Never Smoker     Smokeless tobacco: Never Used   Substance Use Topics     Alcohol use: Yes     Comment: 1 DRINK PER MONTH      Problem (# of Occurrences) Relation (Name,Age of Onset)    C.A.D. (1) Mother    Diabetes (1) Father            Current Outpatient Medications   Medication Sig     ALPRAZolam (XANAX) 1 MG tablet Take 1/2 Tablet by mouth 2 times Daily as needed.     aspirin 325 MG tablet Take 325 mg by mouth daily     baclofen (LIORESAL) 10 MG tablet TAKE ONE-half TABLET (10 MG) BY MOUTH 2 times DAILY if NEEDED FOR MUSCLE SPASMS     medical cannabis (Patient's own supply.  Not a prescription) Take  as instructed. take 1 tab by mouth once daily if needed.     Multiple Vitamins-Minerals (MULTIVITAMIN ADULT PO) Take 1 tablet by mouth daily      Sertraline HCl (ZOLOFT PO) Take 150 mg by mouth daily      TRAMADOL HCL PO Take by mouth as needed      valACYclovir (VALTREX) 500 MG tablet Take 1 tablet (500 mg) by mouth daily     COMPOUNDED NON-CONTROLLED SUBSTANCE (CMPD RX) - PHARMACY TO MIX COMPOUNDED MEDICATION Slidenafil 3%, Nitroglycerin 0.2% paraben-free ointment base-hypoallergenic. Apply  "a small amount of ointment directly to clitoris 30 minutes prior to sexual activity. (Patient not taking: Reported on 11/8/2021)     No current facility-administered medications for this visit.     Allergies   Allergen Reactions     Bactrim Other (See Comments)     Blisters or  sores on tongue     Sulfa Drugs Unknown and Other (See Comments)     Mouth Sores  Mouth Sores     Paroxetine Anxiety     Panic attack  Other reaction(s): Tachycardia     Vicodin [Hydrocodone-Acetaminophen] Anxiety     anxiety       ROS:  12 point review of systems negative other than symptoms noted below or in the HPI.  No urinary frequency or dysuria, bladder or kidney problems      OBJECTIVE:     /80   Ht 1.613 m (5' 3.5\")   Wt 71.2 kg (157 lb)   LMP 11/29/2018   BMI 27.38 kg/m    Body mass index is 27.38 kg/m .    Exam:  Constitutional:  Appearance: Well nourished, well developed alert, in no acute distress  Neurologic:  Mental Status:  Oriented X3.  Normal strength and tone, sensory exam grossly normal, mentation intact and speech normal.    Psychiatric:  Mentation appears normal and affect normal/bright.  Pelvic Exam:  External Genitalia:     Normal appearance for age, no discharge present, no tenderness present, no inflammatory lesions present, color normal  Vagina:     Normal vaginal vault without central or paravaginal defects, no discharge present, no inflammatory lesions present, no masses present  Bladder:     Nontender to palpation  Urethra:   Urethral Body:  Urethra palpation normal, urethra structural support normal   Urethral Meatus:  No erythema or lesions present  Cervix:     Appearance healthy, no lesions present, nontender to palpation, no bleeding present  Pap done.  Uterus:     Surgically absent  Adnexa:     No adnexal tenderness present, no adnexal masses present  Perineum:     Perineum within normal limits, no evidence of trauma, no rashes or skin lesions present  Anus:     Anus within normal limits, no " hemorrhoids present  Inguinal Lymph Nodes:     No lymphadenopathy present  Pubic Hair:     Normal pubic hair distribution for age  Genitalia and Groin:     No rashes present, no lesions present, no areas of discoloration, no masses present       In-Clinic Test Results:  No results found for this or any previous visit (from the past 24 hour(s)).    ASSESSMENT/PLAN:                                                        ICD-10-CM    1. Screening for cervical cancer  Z12.4 Pap screen with HPV - recommended age 30 - 65 years   2. Sebaceous cyst  L72.3 valACYclovir (VALTREX) 500 MG tablet         Normal breast and pelvic exam.  Pap done.  Return prn or 1 year for breast and pelvic exam.    JACQUELINE Burgos CNP  [facility] [place]

## 2021-11-09 ASSESSMENT — PATIENT HEALTH QUESTIONNAIRE - PHQ9: SUM OF ALL RESPONSES TO PHQ QUESTIONS 1-9: 5

## 2021-11-09 ASSESSMENT — ANXIETY QUESTIONNAIRES: GAD7 TOTAL SCORE: 14

## 2021-11-11 LAB
BKR LAB AP GYN ADEQUACY: NORMAL
BKR LAB AP GYN INTERPRETATION: NORMAL
BKR LAB AP HPV REFLEX: NORMAL
BKR LAB AP PREVIOUS ABNORMAL: NORMAL
PATH REPORT.COMMENTS IMP SPEC: NORMAL
PATH REPORT.COMMENTS IMP SPEC: NORMAL
PATH REPORT.RELEVANT HX SPEC: NORMAL

## 2021-11-12 DIAGNOSIS — A60.04 HERPES SIMPLEX VULVOVAGINITIS: ICD-10-CM

## 2021-11-12 LAB
HUMAN PAPILLOMA VIRUS 16 DNA: NEGATIVE
HUMAN PAPILLOMA VIRUS 18 DNA: NEGATIVE
HUMAN PAPILLOMA VIRUS FINAL DIAGNOSIS: NORMAL
HUMAN PAPILLOMA VIRUS OTHER HR: NEGATIVE

## 2021-11-12 RX ORDER — VALACYCLOVIR HYDROCHLORIDE 1 G/1
TABLET, FILM COATED ORAL
Qty: 14 TABLET | Refills: 3 | OUTPATIENT
Start: 2021-11-12

## 2021-11-12 NOTE — TELEPHONE ENCOUNTER
"Requested Prescriptions   Pending Prescriptions Disp Refills     valACYclovir (VALTREX) 1000 mg tablet [Pharmacy Med Name: VALACYCLOVIR 1GM TABLETS] 14 tablet 3     Sig: TAKE 1 TABLET(1000 MG) BY MOUTH TWICE DAILY FOR 7 DAYS       Antivirals for Herpes Protocol Failed - 11/12/2021  2:49 PM        Failed - Normal serum creatinine on file in past 12 months     No lab results found.    Ok to refill medication if creatinine is low          Passed - Patient is age 12 or older        Passed - Recent (12 mo) or future (30 days) visit within the authorizing provider's specialty     Patient has had an office visit with the authorizing provider or a provider within the authorizing providers department within the previous 12 mos or has a future within next 30 days. See \"Patient Info\" tab in inbasket, or \"Choose Columns\" in Meds & Orders section of the refill encounter.              Passed - Medication is active on med list           Last Written Prescription Date:  11/8/21  Last Fill Quantity: 90,  # refills: 3   Last office visit: 11/8/2021 with prescribing provider:  Lizzie   Future Office Visit:      Refill request refused due to :   Adjustment in therapy.  rx sent for daily use.      Susanne Fuentes RN on 11/12/2021 at 2:53 PM            "

## 2022-01-13 ENCOUNTER — ANCILLARY PROCEDURE (OUTPATIENT)
Dept: MAMMOGRAPHY | Facility: CLINIC | Age: 47
End: 2022-01-13
Attending: NURSE PRACTITIONER
Payer: COMMERCIAL

## 2022-01-13 DIAGNOSIS — Z12.31 VISIT FOR SCREENING MAMMOGRAM: ICD-10-CM

## 2022-01-13 PROCEDURE — 77067 SCR MAMMO BI INCL CAD: CPT | Mod: TC | Performed by: RADIOLOGY

## 2022-02-19 ENCOUNTER — HEALTH MAINTENANCE LETTER (OUTPATIENT)
Age: 47
End: 2022-02-19

## 2022-05-24 ENCOUNTER — OFFICE VISIT (OUTPATIENT)
Dept: OBGYN | Facility: CLINIC | Age: 47
End: 2022-05-24
Payer: COMMERCIAL

## 2022-05-24 VITALS
DIASTOLIC BLOOD PRESSURE: 88 MMHG | WEIGHT: 163 LBS | BODY MASS INDEX: 27.83 KG/M2 | SYSTOLIC BLOOD PRESSURE: 132 MMHG | HEIGHT: 64 IN

## 2022-05-24 DIAGNOSIS — N63.25 BREAST LUMP ON LEFT SIDE AT 6 O'CLOCK POSITION: Primary | ICD-10-CM

## 2022-05-24 PROCEDURE — 99213 OFFICE O/P EST LOW 20 MIN: CPT | Performed by: OBSTETRICS & GYNECOLOGY

## 2022-05-24 RX ORDER — LOSARTAN POTASSIUM 25 MG/1
TABLET ORAL
COMMUNITY
Start: 2022-05-10 | End: 2024-03-28

## 2022-05-24 NOTE — PROGRESS NOTES
SUBJECTIVE:                                                   Claire Khan is a 47 year old female who presents to clinic today for the following health issue(s):  Patient presents with:  Breast Problem: Pt palpated a lump under left breast a week ago. Denies any nipple discharge or itching.     HPI:  Kassy noted a painful left breast mass about 1 week ago. No discharge from the breast. She has a history of fibroadenomas and has had a biopsy of the right breast in the past. Her last clinical exam was in November of 2021 and her last mammogram was negative in January of this year.     Patient's last menstrual period was 11/29/2018.    Patient is sexually active, Using hysterectomy for contraception.    reports that she has never smoked. She has never used smokeless tobacco.    STD testing offered?  Declined    Health maintenance updated:  yes    Today's PHQ-2 Score:   PHQ-2 ( 1999 Pfizer) 11/8/2021   Q1: Little interest or pleasure in doing things 1   Q2: Feeling down, depressed or hopeless 1   PHQ-2 Score 2   PHQ-2 Total Score (12-17 Years)- Positive if 3 or more points; Administer PHQ-A if positive -     Today's PHQ-9 Score:   PHQ-9 SCORE 11/8/2021   PHQ-9 Total Score 5     Today's PINA-7 Score:   PINA-7 SCORE 11/8/2021   Total Score 14       Problem list and histories reviewed & adjusted, as indicated.  Additional history: as documented.    Patient Active Problem List   Diagnosis     Myoma     Past Surgical History:   Procedure Laterality Date     ARTHROSCOPY KNEE WITH PATELLAR REALIGNMENT  6/29/2012    Procedure: ARTHROSCOPY KNEE WITH PATELLAR REALIGNMENT;  Right Knee Arthroscopy, Right Medial Patellar Femoral Ligament  Reconstruction with Graft, Partial Lateral Facetectomy, Lateral Retinacular Lengthening  ;  Surgeon: Rain Noe MD;  [facility]     ARTHROSCOPY KNEE WITH RETINACULAR RELEASE  5/30/2014    Procedure: ARTHROSCOPY KNEE WITH RETINACULAR RELEASE MEDIAL OR LATERAL;  Surgeon:  Rain Noe MD;  [facility]     EXTERNAL EAR SURGERY       HYSTEROSCOPY, ABLATE ENDOMETRIUM VERSAPOINT , COMBINED N/A 4/6/2017    Procedure: COMBINED HYSTEROSCOPY, ABLATE ENDOMETRIUM VERSAPOINT;  Surgeon: Sj Samuel MD;  Location: [facility]     LAPAROSCOPIC HYSTERECTOMY SUPRACERVICAL N/A 12/11/2018    Procedure: LAPAROSCOPIC HYSTERECTOMY SUPRACERVICAL, BILATERAL SALPINGECTOMY;  Surgeon: Sj Samuel MD;  Location: [facility]     LAPAROSCOPIC SALPINGECTOMY Bilateral 12/11/2018    Procedure: LAPAROSCOPIC BILATERAL SALPINGECTOMY;  Surgeon: Sj Samuel MD;  Location: [facility]     PARTIAL HYMENECTOMY/REVISION HYMENAL RING       WISDOM TEETH[        Social History     Tobacco Use     Smoking status: Never Smoker     Smokeless tobacco: Never Used   Substance Use Topics     Alcohol use: Yes     Comment: 1 DRINK PER MONTH      Problem (# of Occurrences) Relation (Name,Age of Onset)    C.A.D. (1) Mother    Diabetes (1) Father            Current Outpatient Medications   Medication Sig     ALPRAZolam (XANAX) 1 MG tablet Take 1/2 Tablet by mouth 2 times Daily as needed.     aspirin 325 MG tablet Take 325 mg by mouth daily     losartan (COZAAR) 25 MG tablet TAKE 1 TABLET BY MOUTH EVERY EVENING     medical cannabis (Patient's own supply.  Not a prescription) Take  as instructed. take 1 tab by mouth once daily if needed.     Multiple Vitamins-Minerals (MULTIVITAMIN ADULT PO) Take 1 tablet by mouth daily      Sertraline HCl (ZOLOFT PO) Take 150 mg by mouth daily      TRAMADOL HCL PO Take by mouth as needed      valACYclovir (VALTREX) 500 MG tablet Take 1 tablet (500 mg) by mouth daily     baclofen (LIORESAL) 10 MG tablet TAKE ONE-half TABLET (10 MG) BY MOUTH 2 times DAILY if NEEDED FOR MUSCLE SPASMS (Patient not taking: Reported on 5/24/2022)     No current facility-administered medications for this visit.     Allergies   Allergen Reactions     Bactrim Other (See Comments)     Blisters or  sores on tongue      "Sulfa Drugs Unknown and Other (See Comments)     Mouth Sores  Mouth Sores     Paroxetine Anxiety     Panic attack  Other reaction(s): Tachycardia     Vicodin [Hydrocodone-Acetaminophen] Anxiety     anxiety       ROS:  12 point review of systems negative other than symptoms noted below or in the HPI.  Breast: Lumps and Tenderness  No urinary frequency or dysuria, bladder or kidney problems      OBJECTIVE:     /88   Ht 1.613 m (5' 3.5\")   Wt 73.9 kg (163 lb)   LMP 11/29/2018   BMI 28.42 kg/m    Body mass index is 28.42 kg/m .    Exam:  Constitutional:  Appearance: Well nourished, well developed alert, in no acute distress  Breasts:  Inspection of Breasts:  Symmetric bilaterally.  No puckering.  No skin changes.  Palpation of Breasts and Axillae:  Tender 1.5 cm mass  present on palpation at the 6 0'clock position 5 cm inferior to the nipple. Right breast negative for any masses. Axillary Lymph Nodes:  No lymphadenopathy present         ASSESSMENT/PLAN:                                                        ICD-10-CM    1. Breast lump on left side at 6 o'clock position  N63.25 US Breast Left Limited 1-3 Quadrants     MA Diagnostic Digital Left     Recommend a diagnostic mammogram and left breast ultrasound. Although she has had a fibroadenoma in the past, the tenderness and the presence of the mass deserve more imaging to rule out malignancy.     Casandra Peters MD  [facility] [place]  "

## 2022-05-31 ENCOUNTER — HOSPITAL ENCOUNTER (OUTPATIENT)
Dept: MAMMOGRAPHY | Facility: CLINIC | Age: 47
Discharge: HOME | End: 2022-05-31
Attending: OBSTETRICS & GYNECOLOGY
Payer: COMMERCIAL

## 2022-05-31 DIAGNOSIS — N63.25 BREAST LUMP ON LEFT SIDE AT 6 O'CLOCK POSITION: ICD-10-CM

## 2022-05-31 PROCEDURE — 76642 ULTRASOUND BREAST LIMITED: CPT | Mod: LT

## 2022-05-31 PROCEDURE — 77061 BREAST TOMOSYNTHESIS UNI: CPT | Mod: LT

## 2023-01-27 ENCOUNTER — TELEPHONE (OUTPATIENT)
Dept: OBGYN | Facility: CLINIC | Age: 48
End: 2023-01-27
Payer: COMMERCIAL

## 2023-01-27 NOTE — TELEPHONE ENCOUNTER
Panel Management Review    Date of last visit with a [facility] provider: Lorraine on 05/24/22.  Date of next visit with a [facility] provider:  Leatha on 2/19/23.    Health Maintenance List    Health Maintenance   Topic Date Due     ADVANCE CARE PLANNING  Never done     HEPATITIS B IMMUNIZATION (1 of 3 - 3-dose series) Never done     COVID-19 Vaccine (1) Never done     COLORECTAL CANCER SCREENING  Never done     LIPID  Never done     INFLUENZA VACCINE (1) 09/01/2022     YEARLY PREVENTIVE VISIT  10/29/2022     PHQ-2 (once per calendar year)  01/01/2023     MAMMO SCREENING  05/31/2023     HPV TEST  11/08/2026     PAP  11/08/2026     DTAP/TDAP/TD IMMUNIZATION (4 - Td or Tdap) 12/14/2031     HEPATITIS C SCREENING  Completed     HIV SCREENING  Completed     Pneumococcal Vaccine: Pediatrics (0 to 5 Years) and At-Risk Patients (6 to 64 Years)  Aged Out     IPV IMMUNIZATION  Aged Out     MENINGITIS IMMUNIZATION  Aged Out       Composite cancer screening  Chart review shows that this patient is due/due soon for the following Colonoscopy  Lab Results   Component Value Date    PAP NIL 10/05/2020     Past Surgical History:   Procedure Laterality Date     ARTHROSCOPY KNEE WITH PATELLAR REALIGNMENT  6/29/2012    Procedure: ARTHROSCOPY KNEE WITH PATELLAR REALIGNMENT;  Right Knee Arthroscopy, Right Medial Patellar Femoral Ligament  Reconstruction with Graft, Partial Lateral Facetectomy, Lateral Retinacular Lengthening  ;  Surgeon: Rain Noe MD;  Location:  [facility]     ARTHROSCOPY KNEE WITH RETINACULAR RELEASE  5/30/2014    Procedure: ARTHROSCOPY KNEE WITH RETINACULAR RELEASE MEDIAL OR LATERAL;  Surgeon: Rain Noe MD;  Location:  OR     EXTERNAL EAR SURGERY       HYSTEROSCOPY, ABLATE ENDOMETRIUM VERSAPOINT , COMBINED N/A 4/6/2017    Procedure: COMBINED HYSTEROSCOPY, ABLATE ENDOMETRIUM VERSAPOINT;  Surgeon: Sj Samuel MD;  Location: [facility]     LAPAROSCOPIC HYSTERECTOMY SUPRACERVICAL N/A  12/11/2018    Procedure: LAPAROSCOPIC HYSTERECTOMY SUPRACERVICAL, BILATERAL SALPINGECTOMY;  Surgeon: Sj Samuel MD;  Location: [facility]     LAPAROSCOPIC SALPINGECTOMY Bilateral 12/11/2018    Procedure: LAPAROSCOPIC BILATERAL SALPINGECTOMY;  Surgeon: Sj Samuel MD;  Location: [facility]     PARTIAL HYMENECTOMY/REVISION HYMENAL RING       WISDOM TEETH[           Summary:    Patient is due/failing the following:   Colonoscopy    Action needed: Patient needs referral/order: colonoscopy    Type of outreach:  Sent letter.      Staff Signature:  Anitha Smith MA on 1/27/2023 at 2:34 PM

## 2023-02-08 NOTE — PROGRESS NOTES
SUBJECTIVE:                                                   Claire Khan is a 47 year old female who presents to clinic today for the following health issue(s):  Patient presents with:  Vaginal Problem: Pap only      Additional information: pap only, valacyclovir 1g  HPI:  Patient presents today for pap and pelvic exam only. Sees PCP for general health maintenance. History of LASH with conservation of ovaries. LMP 11/29/2018. Reports recently feeling more hot. When she takes her temperature she ranges from 98-99F. Denies body chills or otherwise feeling ill. Wondering if she is perimenopausal. Saw a holistic provider for elevated temperature who chavo blood work that resulted in high hemoglobin, in which she was instructed to drink more water. Patient is requesting a repeat CBC today. 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 Taking Valacyclovir 1g daily and seeing opthalmology for treatment with improvement. Patient agreeable to student performing exam component of today's visit.     Patient's last menstrual period was 11/29/2018..     Patient is sexually active, No obstetric history on file..  Using hysterectomy for contraception.    reports that she has never smoked. She has never used smokeless tobacco.  STD testing offered?  Declined    Health maintenance updated:  yes    Today's PHQ-2 Score:   PHQ-2 ( 1999 Pfizer) 2/13/2023   Q1: Little interest or pleasure in doing things 1   Q2: Feeling down, depressed or hopeless 0   PHQ-2 Score 1   PHQ-2 Total Score (12-17 Years)- Positive if 3 or more points; Administer PHQ-A if positive -     Today's PHQ-9 Score:   PHQ-9 SCORE 11/8/2021   PHQ-9 Total Score 5     Today's PINA-7 Score:   PINA-7 SCORE 11/8/2021   Total Score 14       Problem list and histories reviewed & adjusted, as indicated.  Additional history: as documented.    Patient Active Problem List   Diagnosis     Myoma     Past Surgical History:   Procedure Laterality Date      ARTHROSCOPY KNEE WITH PATELLAR REALIGNMENT  6/29/2012    Procedure: ARTHROSCOPY KNEE WITH PATELLAR REALIGNMENT;  Right Knee Arthroscopy, Right Medial Patellar Femoral Ligament  Reconstruction with Graft, Partial Lateral Facetectomy, Lateral Retinacular Lengthening  ;  Surgeon: Rain Noe MD;  [facility]     ARTHROSCOPY KNEE WITH RETINACULAR RELEASE  5/30/2014    Procedure: ARTHROSCOPY KNEE WITH RETINACULAR RELEASE MEDIAL OR LATERAL;  Surgeon: Rain Noe MD;  [facility]     EXTERNAL EAR SURGERY       HYSTEROSCOPY, ABLATE ENDOMETRIUM VERSAPOINT , COMBINED N/A 4/6/2017    Procedure: COMBINED HYSTEROSCOPY, ABLATE ENDOMETRIUM VERSAPOINT;  Surgeon: Sj Samuel MD;  Location: [facility]     LAPAROSCOPIC HYSTERECTOMY SUPRACERVICAL N/A 12/11/2018    Procedure: LAPAROSCOPIC HYSTERECTOMY SUPRACERVICAL, BILATERAL SALPINGECTOMY;  Surgeon: Sj Samuel MD;  Location: [facility]     LAPAROSCOPIC SALPINGECTOMY Bilateral 12/11/2018    Procedure: LAPAROSCOPIC BILATERAL SALPINGECTOMY;  Surgeon: Sj Samuel MD;  Location: [facility]     PARTIAL HYMENECTOMY/REVISION HYMENAL RING       WISDOM TEETH[        Social History     Tobacco Use     Smoking status: Never     Smokeless tobacco: Never   Substance Use Topics     Alcohol use: Yes     Comment: 1 DRINK PER MONTH      Problem (# of Occurrences) Relation (Name,Age of Onset)    Diabetes (1) Father    C.A.D. (1) Mother            Current Outpatient Medications   Medication Sig     ALPRAZolam (XANAX) 1 MG tablet Take 1/2 Tablet by mouth 2 times Daily as needed.     aspirin 325 MG tablet Take 325 mg by mouth daily     losartan (COZAAR) 25 MG tablet TAKE 1 TABLET BY MOUTH EVERY EVENING     medical cannabis (Patient's own supply.  Not a prescription) Take  as instructed. take 1 tab by mouth once daily if needed.     Multiple Vitamins-Minerals (MULTIVITAMIN ADULT PO) Take 1 tablet by mouth daily      Sertraline HCl (ZOLOFT PO) Take 150 mg by mouth daily       "TRAMADOL HCL PO Take by mouth as needed      valACYclovir (VALTREX) 500 MG tablet Take 1 tablet (500 mg) by mouth daily     baclofen (LIORESAL) 10 MG tablet TAKE ONE-half TABLET (10 MG) BY MOUTH 2 times DAILY if NEEDED FOR MUSCLE SPASMS (Patient not taking: Reported on 5/24/2022)     No current facility-administered medications for this visit.     Allergies   Allergen Reactions     Bactrim Other (See Comments)     Blisters or  sores on tongue     Sulfa Drugs Unknown and Other (See Comments)     Mouth Sores  Mouth Sores     Paroxetine Anxiety     Panic attack  Other reaction(s): Tachycardia     Vicodin [Hydrocodone-Acetaminophen] Anxiety     anxiety       ROS:  12 point review of systems negative other than symptoms noted below or in the HPI.  No urinary frequency or dysuria, bladder or kidney problems      OBJECTIVE:     /86   Ht 1.613 m (5' 3.5\")   Wt 74.8 kg (164 lb 12.8 oz)   LMP 11/29/2018   BMI 28.74 kg/m    Body mass index is 28.74 kg/m .    Exam:  Constitutional:  Appearance: Well nourished, well developed alert, in no acute distress  Neurologic:  Mental Status:  Oriented X3.  Normal strength and tone, sensory exam grossly normal, mentation intact and speech normal.    Psychiatric:  Mentation appears normal and affect normal/bright.  Pelvic Exam:  External Genitalia:     Normal appearance for age, no discharge present, no tenderness present, no inflammatory lesions present, color normal  Vagina:     Normal vaginal vault without central or paravaginal defects, no discharge present, no inflammatory lesions present, no masses present  Bladder:     Nontender to palpation  Urethra:   Urethral Body:  Urethra palpation normal, urethra structural support normal   Urethral Meatus:  No erythema or lesions present  Cervix:     Appearance healthy, no lesions present, nontender to palpation, no bleeding present. Pap collected.  Uterus:     Surgically absent  Adnexa:     No adnexal tenderness present, no adnexal " masses present  Perineum:     Perineum within normal limits, no evidence of trauma, no rashes or skin lesions present  Anus:     Anus within normal limits, no hemorrhoids present  Inguinal Lymph Nodes:     No lymphadenopathy present  Pubic Hair:     Normal pubic hair distribution for age  Genitalia and Groin:     No rashes present, no lesions present, no areas of discoloration, no masses present       In-Clinic Test Results:  No results found for this or any previous visit (from the past 24 hour(s)).    ASSESSMENT/PLAN:                                                        ICD-10-CM    1. Screening for cervical cancer  Z12.4 Pap screen with HPV - recommended age 30 - 65 years      2. Symptomatic menopausal or female climacteric states  N95.1 Follicle stimulating hormone      3. Abnormal CBC  R79.89 CBC with platelets          There are no Patient Instructions on file for this visit.    Plan  Normal gyn exam. Pap collected. Discussed that she may be going through perimenopause, which could explain her elevated temperature. Will redraw CBC and FSH. Will notify patient of results once they become available. Encouraged patient to continue Valacyclovir regimen for HSV suppression.   RTC prn or annually for well woman visit.     I, Ann Langley, completed the PFSH and ROS. I then acted as a scribe for JACQUELINE Burgos CNP for the remainder of the visit.  Ann Langley BSN, RN  [facility] DNP, [employer] student    I was present with the student who participated in the service and in the documentation of the note. I have verified the history and medical decision-making.  Student participated in the annual exam and I can attest to being personally present for the entire exam. I agree with the assessment and plan of care as documented in the note. JACQUELINE Burgos CNP, APRN CNP  [facility]

## 2023-02-13 ENCOUNTER — ANCILLARY PROCEDURE (OUTPATIENT)
Dept: MAMMOGRAPHY | Facility: CLINIC | Age: 48
End: 2023-02-13
Payer: COMMERCIAL

## 2023-02-13 ENCOUNTER — OFFICE VISIT (OUTPATIENT)
Dept: OBGYN | Facility: CLINIC | Age: 48
End: 2023-02-13
Payer: COMMERCIAL

## 2023-02-13 VITALS
SYSTOLIC BLOOD PRESSURE: 128 MMHG | DIASTOLIC BLOOD PRESSURE: 86 MMHG | HEIGHT: 64 IN | BODY MASS INDEX: 28.13 KG/M2 | WEIGHT: 164.8 LBS

## 2023-02-13 DIAGNOSIS — R79.89 ABNORMAL CBC: ICD-10-CM

## 2023-02-13 DIAGNOSIS — Z12.31 VISIT FOR SCREENING MAMMOGRAM: ICD-10-CM

## 2023-02-13 DIAGNOSIS — Z12.4 SCREENING FOR CERVICAL CANCER: Primary | ICD-10-CM

## 2023-02-13 DIAGNOSIS — N95.1 SYMPTOMATIC MENOPAUSAL OR FEMALE CLIMACTERIC STATES: ICD-10-CM

## 2023-02-13 LAB
ERYTHROCYTE [DISTWIDTH] IN BLOOD BY AUTOMATED COUNT: 12.9 % (ref 10–15)
FSH SERPL IRP2-ACNC: 17.1 MIU/ML
HCT VFR BLD AUTO: 44.7 % (ref 35–47)
HGB BLD-MCNC: 15.1 G/DL (ref 11.7–15.7)
MCH RBC QN AUTO: 30.9 PG (ref 26.5–33)
MCHC RBC AUTO-ENTMCNC: 33.8 G/DL (ref 31.5–36.5)
MCV RBC AUTO: 92 FL (ref 78–100)
PLATELET # BLD AUTO: 146 10E3/UL (ref 150–450)
RBC # BLD AUTO: 4.88 10E6/UL (ref 3.8–5.2)
WBC # BLD AUTO: 9 10E3/UL (ref 4–11)

## 2023-02-13 PROCEDURE — 87624 HPV HI-RISK TYP POOLED RSLT: CPT | Performed by: NURSE PRACTITIONER

## 2023-02-13 PROCEDURE — 77067 SCR MAMMO BI INCL CAD: CPT | Mod: TC | Performed by: RADIOLOGY

## 2023-02-13 PROCEDURE — 85027 COMPLETE CBC AUTOMATED: CPT

## 2023-02-13 PROCEDURE — 36415 COLL VENOUS BLD VENIPUNCTURE: CPT

## 2023-02-13 PROCEDURE — 83001 ASSAY OF GONADOTROPIN (FSH): CPT

## 2023-02-13 PROCEDURE — 77063 BREAST TOMOSYNTHESIS BI: CPT | Mod: TC | Performed by: RADIOLOGY

## 2023-02-13 PROCEDURE — 99213 OFFICE O/P EST LOW 20 MIN: CPT | Performed by: NURSE PRACTITIONER

## 2023-02-13 PROCEDURE — G0145 SCR C/V CYTO,THINLAYER,RESCR: HCPCS | Performed by: NURSE PRACTITIONER

## 2023-02-15 NOTE — RESULT ENCOUNTER NOTE
Called pt with results.   She is going to  send her PCP the CBC results and further discuss the low platelets.  Sabrina Castillo RNC

## 2023-04-01 ENCOUNTER — HEALTH MAINTENANCE LETTER (OUTPATIENT)
Age: 48
End: 2023-04-01

## 2024-01-13 ENCOUNTER — HEALTH MAINTENANCE LETTER (OUTPATIENT)
Age: 49
End: 2024-01-13

## 2024-02-29 ENCOUNTER — LAB REQUISITION (OUTPATIENT)
Dept: LAB | Facility: CLINIC | Age: 49
End: 2024-02-29
Payer: COMMERCIAL

## 2024-02-29 DIAGNOSIS — L08.9 LOCAL INFECTION OF THE SKIN AND SUBCUTANEOUS TISSUE, UNSPECIFIED: ICD-10-CM

## 2024-02-29 PROCEDURE — 87070 CULTURE OTHR SPECIMN AEROBIC: CPT | Mod: ORL | Performed by: DERMATOLOGY

## 2024-03-02 LAB — BACTERIA WND CULT: NORMAL

## 2024-03-26 PROBLEM — M51.369 LUMBAR DEGENERATIVE DISC DISEASE: Status: ACTIVE | Noted: 2020-06-22

## 2024-03-26 PROBLEM — M47.816 LUMBAR FACET ARTHROPATHY: Status: ACTIVE | Noted: 2020-06-22

## 2024-03-26 PROBLEM — A60.00 GENITAL HERPES: Status: ACTIVE | Noted: 2021-11-12

## 2024-03-26 PROBLEM — J31.0 CHRONIC RHINITIS: Status: ACTIVE | Noted: 2022-12-10

## 2024-03-26 NOTE — PROGRESS NOTES
Claire is a 49 year old No obstetric history on file. female who presents for annual exam.     Besides routine health maintenance, she has no other health concerns today .    HPI:  The patient's PCP is ANAHI Yung.      Patient here today for her annual GYN exam and mammogram.  She is doing well.  She had a last preserve her ovaries back in 2018 secondary to a fibroid.  Her colonoscopy is up-to-date as it was completed in 2022.  She is having some hot flashes and night sweats but nothing debilitating.  She does have some right-sided pelvic pain especially with intercourse.          GYNECOLOGIC HISTORY:    Patient's last menstrual period was 11/29/2018.    Her current contraception method is: hysterectomy.  She  reports that she has never smoked. She has never used smokeless tobacco.    Patient is sexually active.  STD testing offered?  Declined  Last PHQ-9 score on record =       3/28/2024     3:15 PM   PHQ-9 SCORE   PHQ-9 Total Score 6     Last GAD7 score on record =       3/28/2024     3:15 PM   PINA-7 SCORE   Total Score 12     Alcohol Score = 1    HEALTH MAINTENANCE:  Cholesterol: 12/10/22 total 177  Last Mammo: One year ago, Result: Normal, Next Mammo: Today     Pap:   Lab Results   Component Value Date    GYNINTERP  02/13/2023     Negative for Intraepithelial Lesion or Malignancy (NILM)    GYNINTERP  11/08/2021     Negative for Intraepithelial Lesion or Malignancy (NILM)    PAP NIL 10/05/2020    PAP NIL 10/01/2019     Colonoscopy:  09/28/22, Result: Normal, Next Colonoscopy: 5 years.  Dexa:  NA    Health maintenance updated:  Yes  HISTORY:  OB History   No obstetric history on file.       Patient Active Problem List   Diagnosis    Myoma    Chronic low back pain    Chronic rhinitis    Factor V Leiden mutation (H24)    PINA (generalized anxiety disorder)    Genital herpes    Lumbar degenerative disc disease    Lumbar facet arthropathy    Essential hypertension     Past Surgical History:   Procedure  Laterality Date    ARTHROSCOPY KNEE WITH PATELLAR REALIGNMENT  6/29/2012    Procedure: ARTHROSCOPY KNEE WITH PATELLAR REALIGNMENT;  Right Knee Arthroscopy, Right Medial Patellar Femoral Ligament  Reconstruction with Graft, Partial Lateral Facetectomy, Lateral Retinacular Lengthening  ;  Surgeon: Rain Noe MD;  [facility]    ARTHROSCOPY KNEE WITH RETINACULAR RELEASE  5/30/2014    Procedure: ARTHROSCOPY KNEE WITH RETINACULAR RELEASE MEDIAL OR LATERAL;  Surgeon: Rain Noe MD;  [facility]    EXTERNAL EAR SURGERY      HYSTEROSCOPY, ABLATE ENDOMETRIUM VERSAPOINT , COMBINED N/A 4/6/2017    Procedure: COMBINED HYSTEROSCOPY, ABLATE ENDOMETRIUM VERSAPOINT;  Surgeon: Sj Samuel MD;  Location: [facility]    LAPAROSCOPIC HYSTERECTOMY SUPRACERVICAL N/A 12/11/2018    Procedure: LAPAROSCOPIC HYSTERECTOMY SUPRACERVICAL, BILATERAL SALPINGECTOMY;  Surgeon: Sj Samuel MD;  Location: [facility]    LAPAROSCOPIC SALPINGECTOMY Bilateral 12/11/2018    Procedure: LAPAROSCOPIC BILATERAL SALPINGECTOMY;  Surgeon: Sj Samuel MD;  Location: [facility]    PARTIAL HYMENECTOMY/REVISION HYMENAL RING      WISDOM TEETH[        Social History     Tobacco Use    Smoking status: Never    Smokeless tobacco: Never   Substance Use Topics    Alcohol use: Yes     Comment: 1 DRINK PER MONTH      Problem (# of Occurrences) Relation (Name,Age of Onset)    Diabetes (1) Father    C.A.D. (1) Mother              Current Outpatient Medications   Medication Sig    ALPRAZolam (XANAX) 1 MG tablet Take 1/2 Tablet by mouth 2 times Daily as needed.    aspirin 325 MG tablet Take 325 mg by mouth daily    doxycycline hyclate (PERIOSTAT) 20 MG tablet Take 1 tablet by mouth 2 times daily    fluticasone (FLONASE) 50 MCG/ACT nasal spray 2 sprays    losartan (COZAAR) 50 MG tablet     medical cannabis (Patient's own supply.  Not a prescription) Take  as instructed. take 1 tab by mouth once daily if needed.    methocarbamol (ROBAXIN) 500 MG  "tablet Take 500 mg by mouth    Multiple Vitamins-Minerals (MULTIVITAMIN ADULT PO) Take 1 tablet by mouth daily     sertraline (ZOLOFT) 100 MG tablet     traMADol (ULTRAM) 50 MG tablet     valACYclovir (VALTREX) 500 MG tablet Take 1 tablet (500 mg) by mouth daily    baclofen (LIORESAL) 10 MG tablet TAKE ONE-half TABLET (10 MG) BY MOUTH 2 times DAILY if NEEDED FOR MUSCLE SPASMS (Patient not taking: Reported on 5/24/2022)     No current facility-administered medications for this visit.     Allergies   Allergen Reactions    Bactrim Other (See Comments)     Blisters or  sores on tongue    Sulfa Antibiotics Unknown and Other (See Comments)     Mouth Sores  Mouth Sores    Paroxetine Anxiety     Panic attack  Other reaction(s): Tachycardia    Vicodin [Hydrocodone-Acetaminophen] Anxiety     anxiety       Past medical, surgical, social and family histories were reviewed and updated in EPIC.    ROS:   12 point review of systems negative other than symptoms noted below or in the HPI.  No urinary frequency or dysuria, bladder or kidney problems    EXAM:  BP (!) 140/90   Ht 1.607 m (5' 3.25\")   Wt 72.3 kg (159 lb 6.4 oz)   LMP 11/29/2018   Breastfeeding No   BMI 28.01 kg/m     BMI: Body mass index is 28.01 kg/m .    PHYSICAL EXAM:  Constitutional:   Appearance: Well nourished, well developed, alert, in no acute distress  Neck:  Lymph Nodes:  No lymphadenopathy present    Thyroid:  Gland size normal, nontender, no nodules or masses present  on palpation  Chest:  Respiratory Effort:  Breathing unlabored  Cardiovascular:    Heart: Auscultation:  Regular rate, normal rhythm, no murmurs present  Breasts: Inspection of Breasts:  No lymphadenopathy present., Palpation of Breasts and Axillae:  No masses present on palpation, no breast tenderness., Axillary Lymph Nodes:  No lymphadenopathy present., and No nodularity, asymmetry or nipple discharge bilaterally.  Gastrointestinal:   Abdominal Examination:  Abdomen nontender to " palpation, tone normal without rigidity or guarding, no masses present, umbilicus without lesions   Liver and Spleen:  No hepatomegaly present, liver nontender to palpation    Hernias:  No hernias present  Lymphatic: Lymph Nodes:  No other lymphadenopathy present  Skin:  General Inspection:  No rashes present, no lesions present, no areas of  discoloration  Neurologic:    Mental Status:  Oriented X3.  Normal strength and tone, sensory exam                grossly normal, mentation intact and speech normal.    Psychiatric:   Mentation appears normal and affect normal/bright.         Pelvic Exam:  External Genitalia:     Normal appearance for age, no discharge present, no tenderness present, no inflammatory lesions present, color normal  Vagina:     Normal vaginal vault without central or paravaginal defects, no discharge present, no inflammatory lesions present, no masses present  Bladder:     Nontender to palpation  Urethra:   Urethral Body:  Urethra palpation normal, urethra structural support normal   Urethral Meatus:  No erythema or lesions present  Cervix:     Appearance healthy, no lesions present, nontender to palpation, no bleeding present  Uterus:     Surgically absent  Adnexa:     No adnexal tenderness present, no adnexal masses present  Perineum:     Perineum within normal limits, no evidence of trauma, no rashes or skin lesions present  Anus:     Anus within normal limits, no hemorrhoids present  Inguinal Lymph Nodes:     No lymphadenopathy present  Pubic Hair:     Normal pubic hair distribution for age  Genitalia and Groin:     No rashes present, no lesions present, no areas of discoloration, no masses present    COUNSELING:   Special attention given to:        Regular exercise       Healthy diet/nutrition       (Yi)menopause management    BMI: Body mass index is 28.01 kg/m .  Weight management plan: Discussed healthy diet and exercise guidelines    ASSESSMENT:  49 year old female with satisfactory  annual exam.    ICD-10-CM    1. Encounter for gynecological examination without abnormal finding  Z01.419 [place] PELVIC EXAMINATION      2. Screening for cervical cancer  Z12.4 Pap screen with HPV - recommended age 30 - 65 years      3. Factor V Leiden mutation (H24)  D68.51           PLAN:  49-year-old perimenopausal female with a right sided tenderness on exam.  Perimenopause was discussed at length with her.  We have given her options for hormonal support and nonhormonal.  She does have factor V Leiden.  So we will keep this in mind.  She is to continue with annual mammograms.  Pap smear was collected and if it is normal she can repeat in 1 year.    JACQUELINE Mcallister CNP

## 2024-03-28 ENCOUNTER — OFFICE VISIT (OUTPATIENT)
Dept: OBGYN | Facility: CLINIC | Age: 49
End: 2024-03-28
Payer: COMMERCIAL

## 2024-03-28 ENCOUNTER — ANCILLARY PROCEDURE (OUTPATIENT)
Dept: MAMMOGRAPHY | Facility: CLINIC | Age: 49
End: 2024-03-28
Payer: COMMERCIAL

## 2024-03-28 VITALS
SYSTOLIC BLOOD PRESSURE: 140 MMHG | HEIGHT: 63 IN | WEIGHT: 159.4 LBS | DIASTOLIC BLOOD PRESSURE: 90 MMHG | BODY MASS INDEX: 28.24 KG/M2

## 2024-03-28 DIAGNOSIS — Z01.419 ENCOUNTER FOR GYNECOLOGICAL EXAMINATION WITHOUT ABNORMAL FINDING: Primary | ICD-10-CM

## 2024-03-28 DIAGNOSIS — Z12.4 SCREENING FOR CERVICAL CANCER: ICD-10-CM

## 2024-03-28 DIAGNOSIS — D68.51 FACTOR V LEIDEN MUTATION (H): ICD-10-CM

## 2024-03-28 DIAGNOSIS — Z12.31 VISIT FOR SCREENING MAMMOGRAM: ICD-10-CM

## 2024-03-28 PROBLEM — I10 ESSENTIAL HYPERTENSION: Status: ACTIVE | Noted: 2023-04-04

## 2024-03-28 PROCEDURE — 87624 HPV HI-RISK TYP POOLED RSLT: CPT | Performed by: NURSE PRACTITIONER

## 2024-03-28 PROCEDURE — 77063 BREAST TOMOSYNTHESIS BI: CPT | Mod: TC | Performed by: RADIOLOGY

## 2024-03-28 PROCEDURE — 77067 SCR MAMMO BI INCL CAD: CPT | Mod: TC | Performed by: RADIOLOGY

## 2024-03-28 PROCEDURE — G0145 SCR C/V CYTO,THINLAYER,RESCR: HCPCS | Performed by: NURSE PRACTITIONER

## 2024-03-28 PROCEDURE — 99396 PREV VISIT EST AGE 40-64: CPT | Performed by: NURSE PRACTITIONER

## 2024-03-28 PROCEDURE — 99459 PELVIC EXAMINATION: CPT | Performed by: NURSE PRACTITIONER

## 2024-03-28 RX ORDER — DOXYCYCLINE HYCLATE 20 MG
1 TABLET ORAL
COMMUNITY
Start: 2024-02-29

## 2024-03-28 RX ORDER — METHOCARBAMOL 500 MG/1
500 TABLET, FILM COATED ORAL
COMMUNITY
Start: 2023-12-14

## 2024-03-28 RX ORDER — FLUTICASONE PROPIONATE 50 MCG
2 SPRAY, SUSPENSION (ML) NASAL
COMMUNITY
Start: 2023-10-16

## 2024-03-28 RX ORDER — SERTRALINE HYDROCHLORIDE 100 MG/1
TABLET, FILM COATED ORAL
COMMUNITY
Start: 2024-03-19

## 2024-03-28 RX ORDER — LOSARTAN POTASSIUM 50 MG/1
TABLET ORAL
COMMUNITY
Start: 2024-02-03

## 2024-03-28 RX ORDER — TRAMADOL HYDROCHLORIDE 50 MG/1
TABLET ORAL
COMMUNITY
Start: 2024-03-04

## 2024-03-28 ASSESSMENT — ANXIETY QUESTIONNAIRES
7. FEELING AFRAID AS IF SOMETHING AWFUL MIGHT HAPPEN: MORE THAN HALF THE DAYS
GAD7 TOTAL SCORE: 12
3. WORRYING TOO MUCH ABOUT DIFFERENT THINGS: MORE THAN HALF THE DAYS
1. FEELING NERVOUS, ANXIOUS, OR ON EDGE: MORE THAN HALF THE DAYS
GAD7 TOTAL SCORE: 12
IF YOU CHECKED OFF ANY PROBLEMS ON THIS QUESTIONNAIRE, HOW DIFFICULT HAVE THESE PROBLEMS MADE IT FOR YOU TO DO YOUR WORK, TAKE CARE OF THINGS AT HOME, OR GET ALONG WITH OTHER PEOPLE: SOMEWHAT DIFFICULT
6. BECOMING EASILY ANNOYED OR IRRITABLE: SEVERAL DAYS
2. NOT BEING ABLE TO STOP OR CONTROL WORRYING: MORE THAN HALF THE DAYS
5. BEING SO RESTLESS THAT IT IS HARD TO SIT STILL: SEVERAL DAYS

## 2024-03-28 ASSESSMENT — PATIENT HEALTH QUESTIONNAIRE - PHQ9
SUM OF ALL RESPONSES TO PHQ QUESTIONS 1-9: 6
5. POOR APPETITE OR OVEREATING: MORE THAN HALF THE DAYS

## 2025-02-10 ENCOUNTER — OFFICE VISIT (OUTPATIENT)
Dept: OBGYN | Facility: CLINIC | Age: 50
End: 2025-02-10
Payer: COMMERCIAL

## 2025-02-10 VITALS — BODY MASS INDEX: 29.17 KG/M2 | SYSTOLIC BLOOD PRESSURE: 136 MMHG | DIASTOLIC BLOOD PRESSURE: 74 MMHG | WEIGHT: 166 LBS

## 2025-02-10 DIAGNOSIS — R19.09 NODULE OF GROIN: Primary | ICD-10-CM

## 2025-02-10 PROCEDURE — 99214 OFFICE O/P EST MOD 30 MIN: CPT | Performed by: NURSE PRACTITIONER

## 2025-02-10 NOTE — PROGRESS NOTES
SUBJECTIVE:                                                   Claire Khan is a 49 year old female who presents to clinic today for the following health issue(s):  Patient presents with:  Mass: Possible cyst on bikini line and still painful       HPI:  Pt here today with concerns of a small tender lump in her left groin.    Has had it for a few months. It sits on her underwear line and it's bothering her.  She's requesting it be removed.     No redness or drainage.     Patient's last menstrual period was 11/29/2018..     Patient is sexually active, No obstetric history on file..  Using hysterectomy for contraception.    reports that she has never smoked. She has never used smokeless tobacco.    STD testing offered?  Declined    Health maintenance updated:  yes    Today's PHQ-2 Score:       3/28/2024     3:15 PM   PHQ-2 ( 1999 [employer])   Q1: Little interest or pleasure in doing things 1   Q2: Feeling down, depressed or hopeless 1   PHQ-2 Score 2     Today's PHQ-9 Score:       3/28/2024     3:15 PM   PHQ-9 SCORE   PHQ-9 Total Score 6     Today's PINA-7 Score:       3/28/2024     3:15 PM   PINA-7 SCORE   Total Score 12       Problem list and histories reviewed & adjusted, as indicated.  Additional history: as documented.    Patient Active Problem List   Diagnosis    Myoma    Chronic low back pain    Chronic rhinitis    Factor V Leiden mutation    PINA (generalized anxiety disorder)    Genital herpes    Lumbar degenerative disc disease    Lumbar facet arthropathy    Essential hypertension     Past Surgical History:   Procedure Laterality Date    ARTHROSCOPY KNEE WITH PATELLAR REALIGNMENT  6/29/2012    Procedure: ARTHROSCOPY KNEE WITH PATELLAR REALIGNMENT;  Right Knee Arthroscopy, Right Medial Patellar Femoral Ligament  Reconstruction with Graft, Partial Lateral Facetectomy, Lateral Retinacular Lengthening  ;  Surgeon: Rain Noe MD;  [facility]    ARTHROSCOPY KNEE WITH RETINACULAR RELEASE  5/30/2014     Procedure: ARTHROSCOPY KNEE WITH RETINACULAR RELEASE MEDIAL OR LATERAL;  Surgeon: Rain Noe MD;  [facility]    EXTERNAL EAR SURGERY      HYSTEROSCOPY, ABLATE ENDOMETRIUM VERSAPOINT , COMBINED N/A 4/6/2017    Procedure: COMBINED HYSTEROSCOPY, ABLATE ENDOMETRIUM VERSAPOINT;  Surgeon: Sj Samuel MD;  Location: [facility]    IR LUMBAR PUNCTURE  12/29/2015    LAPAROSCOPIC HYSTERECTOMY SUPRACERVICAL N/A 12/11/2018    Procedure: LAPAROSCOPIC HYSTERECTOMY SUPRACERVICAL, BILATERAL SALPINGECTOMY;  Surgeon: Sj Samuel MD;  Location: [facility]    LAPAROSCOPIC SALPINGECTOMY Bilateral 12/11/2018    Procedure: LAPAROSCOPIC BILATERAL SALPINGECTOMY;  Surgeon: Sj Samuel MD;  Location: [facility]    PARTIAL HYMENECTOMY/REVISION HYMENAL RING      WISDOM TEETH[        Social History     Tobacco Use    Smoking status: Never    Smokeless tobacco: Never   Substance Use Topics    Alcohol use: Yes     Comment: 1 DRINK PER MONTH      Problem (# of Occurrences) Relation (Name,Age of Onset)    Diabetes (1) Father    C.A.D. (1) Mother              Current Outpatient Medications   Medication Sig Dispense Refill    ALPRAZolam (XANAX) 1 MG tablet Take 1/2 Tablet by mouth 2 times Daily as needed.  0    aspirin 325 MG tablet Take 325 mg by mouth daily      doxycycline hyclate (PERIOSTAT) 20 MG tablet Take 1 tablet by mouth 2 times daily      fluticasone (FLONASE) 50 MCG/ACT nasal spray 2 sprays      losartan (COZAAR) 50 MG tablet       medical cannabis (Patient's own supply.  Not a prescription) Take  as instructed. take 1 tab by mouth once daily if needed.      methocarbamol (ROBAXIN) 500 MG tablet Take 500 mg by mouth      Multiple Vitamins-Minerals (MULTIVITAMIN ADULT PO) Take 1 tablet by mouth daily       sertraline (ZOLOFT) 100 MG tablet       traMADol (ULTRAM) 50 MG tablet       valACYclovir (VALTREX) 500 MG tablet Take 1 tablet (500 mg) by mouth daily 90 tablet 3    baclofen (LIORESAL) 10 MG tablet TAKE ONE-half  TABLET (10 MG) BY MOUTH 2 times DAILY if NEEDED FOR MUSCLE SPASMS (Patient not taking: Reported on 2/10/2025)  2     No current facility-administered medications for this visit.     Allergies   Allergen Reactions    Bactrim Other (See Comments)     Blisters or  sores on tongue    Sulfa Antibiotics Unknown and Other (See Comments)     Mouth Sores  Mouth Sores    Paroxetine Anxiety     Panic attack  Other reaction(s): Tachycardia    Vicodin [Hydrocodone-Acetaminophen] Anxiety     anxiety       ROS:  12 point review of systems negative other than symptoms noted below or in the HPI.  No urinary frequency or dysuria, bladder or kidney problems      OBJECTIVE:     /74   Wt 75.3 kg (166 lb)   LMP 11/29/2018   BMI 29.17 kg/m    Body mass index is 29.17 kg/m .    Exam:  Constitutional:  Appearance: Well nourished, well developed alert, in no acute distress  Neurologic:  Mental Status:  Oriented X3.  Normal strength and tone, sensory exam grossly normal, mentation intact and speech normal.    Psychiatric:  Mentation appears normal and affect normal/bright.  Pelvic Exam:  External Genitalia:     Normal appearance for age, no discharge present, no tenderness present, no inflammatory lesions present, color normal  Perineum:     Perineum within normal limits, no evidence of trauma, no rashes or skin lesions present  Inguinal Lymph Nodes:     No lymphadenopathy present  Pubic Hair:     Normal pubic hair distribution for age  Genitalia and Groin:     0.5 round, mobile rubbery nodule in left groin. No redness, no pointing.     In-Clinic Test Results:  No results found for this or any previous visit (from the past 24 hours).    ASSESSMENT/PLAN:                                                        ICD-10-CM    1. Nodule of groin  R19.09 Adult Gen Surg  Referral          There are no Patient Instructions on file for this visit.    48 yo female with a possible lipoma in left inguinal area. Discussed benign nature. Pt  would like it removed. Offered gen surg consult.     JACQUELINE Mcallister CNP  [facility] [place]

## 2025-02-20 ENCOUNTER — TELEPHONE (OUTPATIENT)
Dept: SURGERY | Facility: CLINIC | Age: 50
End: 2025-02-20

## 2025-02-20 NOTE — TELEPHONE ENCOUNTER
Type of surgery: excision mass left groin  Location of surgery: [facility]  Date and time of surgery: 3/7/25 2:00pm  Surgeon: Dr Shrestha  Pre-Op Appt Date: not required  Post-Op Appt Date: not required   Packet sent out: Yes  Pre-cert/Authorization completed:  Not Applicable  Date: 2/20/25

## 2025-03-07 ENCOUNTER — HOSPITAL ENCOUNTER (OUTPATIENT)
Facility: CLINIC | Age: 50
Discharge: HOME | End: 2025-03-07
Attending: SURGERY | Admitting: SURGERY
Payer: COMMERCIAL

## 2025-03-07 PROCEDURE — 22900 EXC ABDL TUM DEEP < 5 CM: CPT | Performed by: SURGERY

## 2025-03-07 PROCEDURE — 88304 TISSUE EXAM BY PATHOLOGIST: CPT | Mod: TC | Performed by: SURGERY

## 2025-03-07 PROCEDURE — 11403 EXC TR-EXT B9+MARG 2.1-3CM: CPT | Performed by: SURGERY

## 2025-03-07 ASSESSMENT — ACTIVITIES OF DAILY LIVING (ADL)
ADLS_ACUITY_SCORE: 42

## 2025-03-07 NOTE — OP NOTE
General Surgery Operative Note    Date of surgery: 3/7/2025    PREOPERATIVE DIAGNOSIS: Small subcutaneous mass left groin    POSTOPERATIVE DIAGNOSIS: Same    PROCEDURE: 1) Excision of subcutaneous mass in the left groin crease (1 cm in size)    SURGEON:  Ferdinand Shrestha MD      ANESTHESIA:  Local    BLOOD LOSS: 5 ml    FINDINGS: Consistent with a small 1 cm lipoma    INDICATIONS:   Ms. Khan presented with a painful lump as it sits right in the crease of her left groin. Patient is presenting for excision of this    DETAILS OF PROCEDURE: This procedure took place in the endoscopy area [facility] in the [place] area.     The operative site was prepped and draped.    A Surgical timeout was then performed, verifying the correct surgeon, site, procedure, and patient and all in the room were in agreement.     The area was field blocked with 1% lidocaine with epinephrine.  I made an incision through the skin.  The mass was encountered below natalie's fascia.  It was consistent with a small lipoma.  I removed it in its entirety.  Hemostasis was achieved with pressure and electrocautery.  The wound was closed in two layers with vicryl being the deep layer and monocryl for the superficial.  Steri-strips and a dressing were applied.  The mass was sent to pathology for analysis.      Ferdinand Shrestha MD

## 2025-03-07 NOTE — DISCHARGE INSTRUCTIONS
[facility]  Discharge Instructions: Post-Operative Excision of a Mass or Lesion    ACTIVITY  Take frequent, short walks and increase your activity gradually.    Avoid strain to the incision, strenuous physical activity or heavy lifting for 3-4 days.  You may climb stairs.    WOUND CARE  You may remove your outer dressing or Band-Aids and shower 48 hours after the surgery.  Pat your incisions dry and leave them open to air.  Re-apply dressing (Band-Aids or gauze/tape) as needed for comfort or drainage.  You may have steri-strips (looks like white tape) or skin glue on your incisions.  You may peel off the steri-strips 2 weeks after your surgery if they have not peeled off on their own.  If you have skin glue, it will peel up and fall off on its own.  Do not soak your incisions in a tub or pool for 2 weeks.   Do not apply any lotions, creams, or ointments to your incision(s).  A ridge under your incision(s) is normal and will gradually resolve.    DIET  Start with liquids, then gradually resume your regular diet as tolerated.   Drink plenty of liquids to stay hydrated.    PAIN  Expect some tenderness and discomfort at the incision site(s).  Expect gradual resolution of your pain over several days.  You may take over-the-counter ibuprofen with food (unless you have been told not to) or acetaminophen/Tylenol as needed for pain/discomfort.   You may apply ice to your incisions in 20 minute intervals as needed for the next 48 hours.  After that time, consider switching to heat if you prefer.    FOLLOW UP  If you have any questions or concerns, or would like to be seen, please call us at [phone] and ask to speak with our nurse.  We are located at [address] [place] [address].    CALL OUR OFFICE -[phone] IF YOU HAVE:   Chills or fever above 101 F.  Increased redness, warmth, or drainage at your incisions.  Significant bleeding.  Pain not relieved by your  over-the-counter pain medication or rest.  Increasing pain after the first 48 hours.  Any other concerns or questions.           Revised January 2024

## 2025-03-08 ASSESSMENT — ACTIVITIES OF DAILY LIVING (ADL)
ADLS_ACUITY_SCORE: 42

## 2025-03-09 ASSESSMENT — ACTIVITIES OF DAILY LIVING (ADL)
ADLS_ACUITY_SCORE: 42

## 2025-03-10 ASSESSMENT — ACTIVITIES OF DAILY LIVING (ADL)
ADLS_ACUITY_SCORE: 42

## 2025-03-12 LAB
PATH REPORT.COMMENTS IMP SPEC: NORMAL
PATH REPORT.COMMENTS IMP SPEC: NORMAL
PATH REPORT.FINAL DX SPEC: NORMAL
PATH REPORT.GROSS SPEC: NORMAL
PATH REPORT.MICROSCOPIC SPEC OTHER STN: NORMAL
PATH REPORT.RELEVANT HX SPEC: NORMAL
PHOTO IMAGE: NORMAL

## 2025-03-12 PROCEDURE — 88342 IMHCHEM/IMCYTCHM 1ST ANTB: CPT | Mod: 26 | Performed by: PATHOLOGY

## 2025-03-12 PROCEDURE — 88304 TISSUE EXAM BY PATHOLOGIST: CPT | Mod: 26 | Performed by: PATHOLOGY

## 2025-04-04 ENCOUNTER — ANCILLARY PROCEDURE (OUTPATIENT)
Dept: MAMMOGRAPHY | Facility: CLINIC | Age: 50
End: 2025-04-04
Attending: NURSE PRACTITIONER
Payer: COMMERCIAL

## 2025-04-04 DIAGNOSIS — Z12.31 VISIT FOR SCREENING MAMMOGRAM: ICD-10-CM

## 2025-04-04 PROCEDURE — 77067 SCR MAMMO BI INCL CAD: CPT | Mod: TC | Performed by: RADIOLOGY

## 2025-04-04 PROCEDURE — 77063 BREAST TOMOSYNTHESIS BI: CPT | Mod: TC | Performed by: RADIOLOGY

## 2025-04-07 ENCOUNTER — ANCILLARY ORDERS (OUTPATIENT)
Dept: MAMMOGRAPHY | Facility: CLINIC | Age: 50
End: 2025-04-07
Payer: COMMERCIAL

## 2025-04-07 DIAGNOSIS — R92.8 ABNORMAL MAMMOGRAM: Primary | ICD-10-CM

## 2025-04-15 ENCOUNTER — HOSPITAL ENCOUNTER (OUTPATIENT)
Dept: MAMMOGRAPHY | Facility: CLINIC | Age: 50
Discharge: HOME | End: 2025-04-15
Attending: NURSE PRACTITIONER
Payer: COMMERCIAL

## 2025-04-15 DIAGNOSIS — R92.8 ABNORMAL MAMMOGRAM: ICD-10-CM

## 2025-04-15 PROCEDURE — 76642 ULTRASOUND BREAST LIMITED: CPT | Mod: RT

## 2025-04-15 PROCEDURE — 77065 DX MAMMO INCL CAD UNI: CPT | Mod: RT

## 2025-04-24 ENCOUNTER — HOSPITAL ENCOUNTER (OUTPATIENT)
Dept: MAMMOGRAPHY | Facility: CLINIC | Age: 50
Discharge: HOME | End: 2025-04-24
Attending: NURSE PRACTITIONER
Payer: COMMERCIAL

## 2025-04-24 DIAGNOSIS — R92.8 ABNORMAL MAMMOGRAM: ICD-10-CM

## 2025-04-24 PROCEDURE — 272N000615 US BREAST BIOPSY CORE NEEDLE, EA ADDL LESION RIGHT

## 2025-04-24 PROCEDURE — 272N000615 US BREAST BIOPSY CORE NEEDLE RIGHT

## 2025-04-24 PROCEDURE — A4648 IMPLANTABLE TISSUE MARKER: HCPCS

## 2025-04-24 PROCEDURE — 250N000009 HC RX 250: Performed by: NURSE PRACTITIONER

## 2025-04-24 RX ADMIN — LIDOCAINE HYDROCHLORIDE 5 ML: 10 INJECTION, SOLUTION INFILTRATION; PERINEURAL at 08:54

## 2025-04-24 RX ADMIN — LIDOCAINE HYDROCHLORIDE 5 ML: 10 INJECTION, SOLUTION INFILTRATION; PERINEURAL at 08:55

## 2025-04-24 NOTE — DISCHARGE INSTRUCTIONS
After Your Breast Biopsy  Bleeding, bruising, and pain  Breast tenderness and some bruising is normal and may last several days. You may wear your bra overnight to support the breast.  You may use an ice pack for pain. Place it over the area for 15 to 20 minutes, several times a day.  You may take over-the-counter pain medicine:  On the day of the biopsy, we recommend Tylenol (acetaminophen) because it does not raise your risk of bleeding.  The next day, you may take an anti-inflammatory medicine (aspirin, ibuprofen, Motrin, Aleve, Advil), unless your doctor tells you not to.  Bandages and showering  Keep your bandage in place until tomorrow morning. Don't get it wet.  If you have small pieces of tape on the skin, leave them in place. They will fall off on their own, or you can remove them after 5 days.  You may shower the next morning after your biopsy.  Activity  No heavy activity (no running, no gym workouts, no lifting, no vacuuming, etc.) on the day of your biopsy.  You may go back to normal activity the next day. But limit what you do if you still have pain or discomfort.  Infection  Infection is rare. Signs of infection include:  Fever (including sweats and chills)  Redness  Pain that gets worse  Fluid draining from the biopsy site  Biopsy results  Results may take up to 5 business days.  A nurse or doctor from the Breast Center will call with your results. We will also send the results to the doctor that ordered your biopsy.  If you have not gotten your results in 5 days, please call the Breast Center.  Call the Breast Center with questions or if:   You have bleeding that lasts more than 20 minutes.  You have pain that you can't control.  You have signs of infection (fever, sweats, chills, redness, increasing pain, or drainage).  After hours, please call the doctor who ordered your biopsy.  For informational purposes only. Not to replace the advice of your health care provider. Copyright   2010 [facility]. All rights reserved. Clinically reviewed by Christi Love, Director, [facility]. Variable [id number] - REV 08/23.

## 2025-04-24 NOTE — DISCHARGE INSTRUCTIONS
After Your Breast Biopsy  Bleeding, bruising, and pain  Breast tenderness and some bruising is normal and may last several days. You may wear your bra overnight to support the breast.  You may use an ice pack for pain. Place it over the area for 15 to 20 minutes, several times a day.  You may take over-the-counter pain medicine:  On the day of the biopsy, we recommend Tylenol (acetaminophen) because it does not raise your risk of bleeding.  The next day, you may take an anti-inflammatory medicine (aspirin, ibuprofen, Motrin, Aleve, Advil), unless your doctor tells you not to.  Bandages and showering  Keep your bandage in place until tomorrow morning. Don't get it wet.  If you have small pieces of tape on the skin, leave them in place. They will fall off on their own, or you can remove them after 5 days.  You may shower the next morning after your biopsy.  Activity  No heavy activity (no running, no gym workouts, no lifting, no vacuuming, etc.) on the day of your biopsy.  You may go back to normal activity the next day. But limit what you do if you still have pain or discomfort.  Infection  Infection is rare. Signs of infection include:  Fever (including sweats and chills)  Redness  Pain that gets worse  Fluid draining from the biopsy site  Biopsy results  Results may take up to 5 business days.  A nurse or doctor from the Breast Center will call with your results. We will also send the results to the doctor that ordered your biopsy.  If you have not gotten your results in 5 days, please call the Breast Center.  Call the Breast Center with questions or if:   You have bleeding that lasts more than 20 minutes.  You have pain that you can't control.  You have signs of infection (fever, sweats, chills, redness, increasing pain, or drainage).  After hours, please call the doctor who ordered your biopsy.  For informational purposes only. Not to replace the advice of your health care provider. Copyright   2010 [facility]. All rights reserved. Clinically reviewed by Christi Love, Director, [facility]. [place] [id number] - REV 08/23.

## 2025-05-03 ENCOUNTER — HEALTH MAINTENANCE LETTER (OUTPATIENT)
Age: 50
End: 2025-05-03

## 2025-05-09 NOTE — PROGRESS NOTES
Claire is a 50 year old No obstetric history on file. female who presents for annual exam.     Besides routine health maintenance,  she would like to discuss .    HPI:  The patient's PCP is ANAHI Yung.    Pt here today for her annual gyn exam.   Mammo is UTD. She unfortunately had a call back and bx, which were B9-fibroadenoma. Still has breast tenderness but she has breast tenderness.     Hx of endometriosis-hx of LASH.     With all of the stress of her mammo call back's she had an HSV outbreak. She has been taking her valtrex twice daily for the last few days with some relief.       GYNECOLOGIC HISTORY:    Patient's last menstrual period was 11/29/2018.    Her current contraception method is: hysterectomy.  She  reports that she has never smoked. She has never used smokeless tobacco.    Patient is sexually active.  STD testing offered?  Declined  Last PHQ-9 score on record =       3/28/2024     3:15 PM   PHQ-9 SCORE   PHQ-9 Total Score 6     Last GAD7 score on record =       3/28/2024     3:15 PM   PINA-7 SCORE   Total Score 12     Alcohol Score =     HEALTH MAINTENANCE:  Cholesterol: 12/09/2024 total 181  Last Mammo: 4/2025 Rt breast US and BX, Result: Normal, Next Mammo: 4/2026  Pap:   Lab Results   Component Value Date    GYNINTERP  03/28/2024     Negative for Intraepithelial Lesion or Malignancy (NILM)    GYNINTERP  02/13/2023     Negative for Intraepithelial Lesion or Malignancy (NILM)    GYNINTERP  11/08/2021     Negative for Intraepithelial Lesion or Malignancy (NILM)    PAP NIL 10/05/2020    PAP NIL 10/01/2019     Colonoscopy:  9/28/2022, Result: Normal, Next Colonoscopy: 5 years.  Dexa:  NA    Health maintenance updated:  Yes    HISTORY:  OB History   No obstetric history on file.       Patient Active Problem List   Diagnosis    Myoma    Chronic low back pain    Chronic rhinitis    Factor V Leiden mutation    PINA (generalized anxiety disorder)    Genital herpes    Lumbar degenerative disc  disease    Lumbar facet arthropathy    Essential hypertension     Past Surgical History:   Procedure Laterality Date    ARTHROSCOPY KNEE WITH PATELLAR REALIGNMENT  6/29/2012    Procedure: ARTHROSCOPY KNEE WITH PATELLAR REALIGNMENT;  Right Knee Arthroscopy, Right Medial Patellar Femoral Ligament  Reconstruction with Graft, Partial Lateral Facetectomy, Lateral Retinacular Lengthening  ;  Surgeon: Rain Noe MD;  [facility]    ARTHROSCOPY KNEE WITH RETINACULAR RELEASE  5/30/2014    Procedure: ARTHROSCOPY KNEE WITH RETINACULAR RELEASE MEDIAL OR LATERAL;  Surgeon: Rain Noe MD;  [facility]    EXTERNAL EAR SURGERY      HYSTEROSCOPY, ABLATE ENDOMETRIUM VERSAPOINT , COMBINED N/A 4/6/2017    Procedure: COMBINED HYSTEROSCOPY, ABLATE ENDOMETRIUM VERSAPOINT;  Surgeon: Sj Samuel MD;  Location: [facility]    IR LUMBAR PUNCTURE  12/29/2015    LAPAROSCOPIC HYSTERECTOMY SUPRACERVICAL N/A 12/11/2018    Procedure: LAPAROSCOPIC HYSTERECTOMY SUPRACERVICAL, BILATERAL SALPINGECTOMY;  Surgeon: Sj Samuel MD;  Location: [facility]    LAPAROSCOPIC SALPINGECTOMY Bilateral 12/11/2018    Procedure: LAPAROSCOPIC BILATERAL SALPINGECTOMY;  Surgeon: Sj Samuel MD;  Location: [facility]    PARTIAL HYMENECTOMY/REVISION HYMENAL RING      WISDOM TEETH[        Social History     Tobacco Use    Smoking status: Never    Smokeless tobacco: Never   Substance Use Topics    Alcohol use: Yes     Comment: 1 DRINK PER MONTH      Problem (# of Occurrences) Relation (Name,Age of Onset)    Diabetes (1) Father    C.A.D. (1) Mother              Current Outpatient Medications   Medication Sig Dispense Refill    ALPRAZolam (XANAX) 1 MG tablet Take 1/2 Tablet by mouth 2 times Daily as needed.  0    aspirin 325 MG tablet Take 325 mg by mouth daily      doxycycline hyclate (PERIOSTAT) 20 MG tablet Take 1 tablet by mouth 2 times daily      fluticasone (FLONASE) 50 MCG/ACT nasal spray 2 sprays      losartan (COZAAR) 50 MG tablet        "medical cannabis (Patient's own supply.  Not a prescription) Take  as instructed. take 1 tab by mouth once daily if needed.      methocarbamol (ROBAXIN) 500 MG tablet Take 500 mg by mouth      Multiple Vitamins-Minerals (MULTIVITAMIN ADULT PO) Take 1 tablet by mouth daily       sertraline (ZOLOFT) 100 MG tablet       traMADol (ULTRAM) 50 MG tablet       valACYclovir (VALTREX) 500 MG tablet Take 1 tablet (500 mg) by mouth daily. 90 tablet 3    baclofen (LIORESAL) 10 MG tablet  (Patient not taking: Reported on 5/13/2025)  2     No current facility-administered medications for this visit.     Allergies   Allergen Reactions    Bactrim Other (See Comments)     Blisters or  sores on tongue    Sulfa Antibiotics Unknown and Other (See Comments)     Mouth Sores  Mouth Sores    Paroxetine Anxiety     Panic attack  Other reaction(s): Tachycardia    Vicodin [Hydrocodone-Acetaminophen] Anxiety     anxiety       Past medical, surgical, social and family histories were reviewed and updated in [facility].    EXAM:  /66   Ht 1.607 m (5' 3.25\")   Wt 73.8 kg (162 lb 12.8 oz)   LMP 11/29/2018   Breastfeeding No   BMI 28.61 kg/m     BMI: Body mass index is 28.61 kg/m .    PHYSICAL EXAM:  Constitutional:   Appearance: Well nourished, well developed, alert, in no acute distress  Breasts: Inspection of Breasts:  No lymphadenopathy present., Palpation of Breasts and Axillae:  No masses present on palpation, no breast tenderness., Axillary Lymph Nodes:  No lymphadenopathy present., No nodularity, asymmetry or nipple discharge bilaterally., and extremely dense bilaterally  Lymphatic: Lymph Nodes:  No other lymphadenopathy present  Skin:  General Inspection:  No rashes present, no lesions present, no areas of  discoloration  Neurologic:    Mental Status:  Oriented X3.  Normal strength and tone, sensory exam                grossly normal, mentation intact and speech normal.    Psychiatric:   Mentation appears normal and affect " normal/bright.         Pelvic Exam:  External Genitalia:     Normal appearance for age, no discharge present, no tenderness present, no inflammatory lesions present, color normal  Vagina:     Normal vaginal vault without central or paravaginal defects, no discharge present, no inflammatory lesions present, no masses present  Bladder:     Nontender to palpation  Urethra:   Urethral Body:  Urethra palpation normal, urethra structural support normal   Urethral Meatus:  No erythema or lesions present  Cervix:     Appearance healthy, no lesions present, nontender to palpation, no bleeding present  Uterus:     Surgically absent  Adnexa:     No adnexal tenderness present, no adnexal masses present  Perineum:     Perineum within normal limits, no evidence of trauma, no rashes or skin lesions present  Anus:     Anus within normal limits, no hemorrhoids present  Inguinal Lymph Nodes:     No lymphadenopathy present  Pubic Hair:     Normal pubic hair distribution for age  Genitalia and Groin:     No rashes present, no lesions present, no areas of discoloration, no masses present    COUNSELING:   Special attention given to:        Regular exercise       Healthy diet/nutrition    BMI: Body mass index is 28.61 kg/m .  Weight management plan: Discussed healthy diet and exercise guidelines    ASSESSMENT:  50 year old female with satisfactory annual exam.    ICD-10-CM    1. Encounter for gynecological examination without abnormal finding  Z01.419 HPV and Gynecologic Cytology Panel - Recommended Age 30-65 Years      2. Herpes simplex infection of perianal skin  A60.1 valACYclovir (VALTREX) 500 MG tablet          PLAN:  49 yo female with a single HSV outbreak on the right labia-continue BID dosing for a few more days.   Pap udpated. If NIL repeat in 1 year   Continue annual mammos.       JACQUELINE Mcallister CNP

## 2025-05-13 ENCOUNTER — OFFICE VISIT (OUTPATIENT)
Dept: OBGYN | Facility: CLINIC | Age: 50
End: 2025-05-13
Payer: COMMERCIAL

## 2025-05-13 VITALS
HEIGHT: 63 IN | BODY MASS INDEX: 28.84 KG/M2 | WEIGHT: 162.8 LBS | SYSTOLIC BLOOD PRESSURE: 108 MMHG | DIASTOLIC BLOOD PRESSURE: 66 MMHG

## 2025-05-13 DIAGNOSIS — A60.1 HERPES SIMPLEX INFECTION OF PERIANAL SKIN: ICD-10-CM

## 2025-05-13 DIAGNOSIS — Z01.419 ENCOUNTER FOR GYNECOLOGICAL EXAMINATION WITHOUT ABNORMAL FINDING: Primary | ICD-10-CM

## 2025-05-13 PROCEDURE — 87624 HPV HI-RISK TYP POOLED RSLT: CPT | Performed by: NURSE PRACTITIONER

## 2025-05-13 PROCEDURE — 99459 PELVIC EXAMINATION: CPT | Performed by: NURSE PRACTITIONER

## 2025-05-13 PROCEDURE — 3074F SYST BP LT 130 MM HG: CPT | Performed by: NURSE PRACTITIONER

## 2025-05-13 PROCEDURE — 3078F DIAST BP <80 MM HG: CPT | Performed by: NURSE PRACTITIONER

## 2025-05-13 PROCEDURE — 99396 PREV VISIT EST AGE 40-64: CPT | Performed by: NURSE PRACTITIONER

## 2025-05-13 PROCEDURE — 99213 OFFICE O/P EST LOW 20 MIN: CPT | Mod: 25 | Performed by: NURSE PRACTITIONER

## 2025-05-13 RX ORDER — VALACYCLOVIR HYDROCHLORIDE 500 MG/1
500 TABLET, FILM COATED ORAL DAILY
Qty: 90 TABLET | Refills: 3 | Status: SHIPPED | OUTPATIENT
Start: 2025-05-13

## 2025-05-14 LAB
HPV HR 12 DNA CVX QL NAA+PROBE: NEGATIVE
HPV16 DNA CVX QL NAA+PROBE: NEGATIVE
HPV18 DNA CVX QL NAA+PROBE: NEGATIVE
HUMAN PAPILLOMA VIRUS FINAL DIAGNOSIS: NORMAL

## 2025-05-15 ENCOUNTER — RESULTS FOLLOW-UP (OUTPATIENT)
Dept: OBGYN | Facility: CLINIC | Age: 50
End: 2025-05-15

## 2025-05-19 LAB
BKR AP ASSOCIATED HPV REPORT: NORMAL
BKR LAB AP GYN ADEQUACY: NORMAL
BKR LAB AP GYN INTERPRETATION: NORMAL
BKR LAB AP PREVIOUS ABNORMAL: NORMAL
PATH REPORT.COMMENTS IMP SPEC: NORMAL
PATH REPORT.COMMENTS IMP SPEC: NORMAL
PATH REPORT.RELEVANT HX SPEC: NORMAL

## (undated) DEVICE — NDL INSUFFLATION 13GA 120MM C2201

## (undated) DEVICE — SYR 05ML SLIP TIP W/O NDL 309647

## (undated) DEVICE — GLOVE PROTEXIS MICRO 7.5  2D73PM75

## (undated) DEVICE — ENDO TROCAR FIRST ENTRY KII FIOS Z-THRD 11X100MM CTF33

## (undated) DEVICE — PACK TVT HYSTEROSCOPY SMA15HYFSE

## (undated) DEVICE — WIPES FOLEY CARE SURESTEP PROVON DFC100

## (undated) DEVICE — ESU FCP OLYMPUS PK CUTTING 5MMX33CM PK-CF0533

## (undated) DEVICE — MORCELLATOR LAPAROSCOPIC LINA XCISE MOR-1515-6

## (undated) DEVICE — TUBING IRRIG TUR Y TYPE 96" LF 6543-01

## (undated) DEVICE — NDL 19GA 1.5"

## (undated) DEVICE — Device

## (undated) DEVICE — CATH TRAY FOLEY SURESTEP 16FR WDRAIN BAG STLK LATEX A300316A

## (undated) DEVICE — PREP SKIN SCRUB TRAY 4461A

## (undated) DEVICE — SYR 10ML FINGER CONTROL W/O NDL 309695

## (undated) DEVICE — SUCTION CANISTER MEDIVAC LINER 3000ML W/LID 65651-530

## (undated) DEVICE — TUBING SUCTION 12"X1/4" N612

## (undated) DEVICE — TUBING HYDRO-SURG PLUS LAP IRRIGATOR SUCTION 0026870

## (undated) DEVICE — SOL WATER IRRIG 1000ML BOTTLE 2F7114

## (undated) DEVICE — DEVICE SUTURE GRASPER TROCAR CLOSURE 14GA PMITCSG

## (undated) DEVICE — SOL NACL 0.9% INJ 1000ML BAG 2B1324X

## (undated) DEVICE — LINEN TOWEL PACK X5 5464

## (undated) DEVICE — ESU ELEC BLADE 6" COATED E1450-6

## (undated) DEVICE — SU VICRYL 2-0 TIE 12X18" J905T

## (undated) DEVICE — ESU PENCIL W/HOLSTER E2350H

## (undated) DEVICE — SOL NACL 0.9% IRRIG 1000ML BOTTLE 07138-09

## (undated) DEVICE — NDL 22GA 1.5"

## (undated) DEVICE — SOL RINGERS LACTATED 1000ML BAG 2B2324X

## (undated) DEVICE — SU VICRYL 2-0 UR-6 27" J602H

## (undated) DEVICE — SYSTEM CLEARIFY VISUALIZATION 21-345

## (undated) DEVICE — GLOVE PROTEXIS W/NEU-THERA 8.0  2D73TE80

## (undated) DEVICE — SU VICRYL 4-0 PS-2 18" UND J496H

## (undated) DEVICE — ESU HANDPIECE OLYMPUS PK SPATULA PK-SP0533

## (undated) RX ORDER — PROPOFOL 10 MG/ML
INJECTION, EMULSION INTRAVENOUS
Status: DISPENSED
Start: 2018-12-11

## (undated) RX ORDER — PROPOFOL 10 MG/ML
INJECTION, EMULSION INTRAVENOUS
Status: DISPENSED
Start: 2017-04-06

## (undated) RX ORDER — CEFAZOLIN SODIUM 2 G/100ML
INJECTION, SOLUTION INTRAVENOUS
Status: DISPENSED
Start: 2018-12-11

## (undated) RX ORDER — LIDOCAINE HYDROCHLORIDE 20 MG/ML
INJECTION, SOLUTION EPIDURAL; INFILTRATION; INTRACAUDAL; PERINEURAL
Status: DISPENSED
Start: 2018-12-11

## (undated) RX ORDER — NEOSTIGMINE METHYLSULFATE 1 MG/ML
VIAL (ML) INJECTION
Status: DISPENSED
Start: 2018-12-11

## (undated) RX ORDER — FENTANYL CITRATE 50 UG/ML
INJECTION, SOLUTION INTRAMUSCULAR; INTRAVENOUS
Status: DISPENSED
Start: 2018-12-11

## (undated) RX ORDER — ONDANSETRON 2 MG/ML
INJECTION INTRAMUSCULAR; INTRAVENOUS
Status: DISPENSED
Start: 2017-04-06

## (undated) RX ORDER — HYDROMORPHONE HYDROCHLORIDE 1 MG/ML
INJECTION, SOLUTION INTRAMUSCULAR; INTRAVENOUS; SUBCUTANEOUS
Status: DISPENSED
Start: 2018-12-11

## (undated) RX ORDER — FENTANYL CITRATE 50 UG/ML
INJECTION, SOLUTION INTRAMUSCULAR; INTRAVENOUS
Status: DISPENSED
Start: 2017-04-06

## (undated) RX ORDER — LIDOCAINE HYDROCHLORIDE 10 MG/ML
INJECTION, SOLUTION EPIDURAL; INFILTRATION; INTRACAUDAL; PERINEURAL
Status: DISPENSED
Start: 2018-12-11

## (undated) RX ORDER — OXYCODONE AND ACETAMINOPHEN 5; 325 MG/1; MG/1
TABLET ORAL
Status: DISPENSED
Start: 2017-04-06

## (undated) RX ORDER — DEXAMETHASONE SODIUM PHOSPHATE 4 MG/ML
INJECTION, SOLUTION INTRA-ARTICULAR; INTRALESIONAL; INTRAMUSCULAR; INTRAVENOUS; SOFT TISSUE
Status: DISPENSED
Start: 2017-04-06

## (undated) RX ORDER — ONDANSETRON 2 MG/ML
INJECTION INTRAMUSCULAR; INTRAVENOUS
Status: DISPENSED
Start: 2018-12-11

## (undated) RX ORDER — DEXAMETHASONE SODIUM PHOSPHATE 4 MG/ML
INJECTION, SOLUTION INTRA-ARTICULAR; INTRALESIONAL; INTRAMUSCULAR; INTRAVENOUS; SOFT TISSUE
Status: DISPENSED
Start: 2018-12-11

## (undated) RX ORDER — GLYCOPYRROLATE 0.2 MG/ML
INJECTION, SOLUTION INTRAMUSCULAR; INTRAVENOUS
Status: DISPENSED
Start: 2018-12-11

## (undated) RX ORDER — KETOROLAC TROMETHAMINE 30 MG/ML
INJECTION, SOLUTION INTRAMUSCULAR; INTRAVENOUS
Status: DISPENSED
Start: 2017-04-06

## (undated) RX ORDER — LIDOCAINE HYDROCHLORIDE 20 MG/ML
INJECTION, SOLUTION EPIDURAL; INFILTRATION; INTRACAUDAL; PERINEURAL
Status: DISPENSED
Start: 2017-04-06

## (undated) RX ORDER — SCOLOPAMINE TRANSDERMAL SYSTEM 1 MG/1
PATCH, EXTENDED RELEASE TRANSDERMAL
Status: DISPENSED
Start: 2018-12-11